# Patient Record
Sex: FEMALE | Race: WHITE | Employment: OTHER | URBAN - METROPOLITAN AREA
[De-identification: names, ages, dates, MRNs, and addresses within clinical notes are randomized per-mention and may not be internally consistent; named-entity substitution may affect disease eponyms.]

---

## 2017-03-13 PROBLEM — F32.1 MODERATE SINGLE CURRENT EPISODE OF MAJOR DEPRESSIVE DISORDER (HCC): Status: ACTIVE | Noted: 2017-03-13

## 2017-03-13 PROBLEM — Z71.89 ADVANCED CARE PLANNING/COUNSELING DISCUSSION: Chronic | Status: ACTIVE | Noted: 2017-03-13

## 2017-03-13 PROBLEM — Z71.89 ADVANCED CARE PLANNING/COUNSELING DISCUSSION: Status: ACTIVE | Noted: 2017-03-13

## 2017-03-13 PROBLEM — F51.01 PRIMARY INSOMNIA: Status: ACTIVE | Noted: 2017-03-13

## 2017-04-06 PROBLEM — E03.8 SUBCLINICAL HYPOTHYROIDISM: Status: ACTIVE | Noted: 2017-04-06

## 2017-04-11 PROBLEM — I10 ESSENTIAL HYPERTENSION: Status: ACTIVE | Noted: 2017-04-11

## 2017-04-13 ENCOUNTER — HOSPITAL ENCOUNTER (OUTPATIENT)
Dept: LAB | Age: 77
Discharge: HOME | End: 2017-04-13
Payer: MEDICARE

## 2017-04-13 DIAGNOSIS — R63.4 WEIGHT LOSS: ICD-10-CM

## 2017-04-13 DIAGNOSIS — R93.89 ABNORMAL CHEST X-RAY: ICD-10-CM

## 2017-04-13 PROCEDURE — 87116 MYCOBACTERIA CULTURE: CPT | Performed by: INTERNAL MEDICINE

## 2017-04-15 ENCOUNTER — HOSPITAL ENCOUNTER (OUTPATIENT)
Dept: CT IMAGING | Age: 77
Discharge: HOME | End: 2017-04-15
Attending: FAMILY MEDICINE
Payer: MEDICARE

## 2017-04-15 ENCOUNTER — HOSPITAL ENCOUNTER (OUTPATIENT)
Dept: CT IMAGING | Age: 77
Discharge: HOME | End: 2017-04-15
Attending: INTERNAL MEDICINE
Payer: MEDICARE

## 2017-04-15 DIAGNOSIS — R63.4 WEIGHT LOSS: ICD-10-CM

## 2017-04-15 DIAGNOSIS — R68.81 EARLY SATIETY: ICD-10-CM

## 2017-04-15 DIAGNOSIS — R93.89 ABNORMAL CHEST X-RAY: ICD-10-CM

## 2017-04-15 PROCEDURE — 74011636320 HC RX REV CODE- 636/320: Performed by: FAMILY MEDICINE

## 2017-04-15 PROCEDURE — 74011000258 HC RX REV CODE- 258: Performed by: FAMILY MEDICINE

## 2017-04-15 PROCEDURE — 74177 CT ABD & PELVIS W/CONTRAST: CPT

## 2017-04-15 PROCEDURE — 71250 CT THORAX DX C-: CPT

## 2017-04-15 RX ORDER — SODIUM CHLORIDE 0.9 % (FLUSH) 0.9 %
10 SYRINGE (ML) INJECTION
Status: COMPLETED | OUTPATIENT
Start: 2017-04-15 | End: 2017-04-15

## 2017-04-15 RX ADMIN — SODIUM CHLORIDE 100 ML: 900 INJECTION, SOLUTION INTRAVENOUS at 09:10

## 2017-04-15 RX ADMIN — DIATRIZOATE MEGLUMINE AND DIATRIZOATE SODIUM 15 ML: 660; 100 LIQUID ORAL; RECTAL at 09:10

## 2017-04-15 RX ADMIN — IOVERSOL 100 ML: 741 INJECTION INTRA-ARTERIAL; INTRAVENOUS at 09:10

## 2017-04-15 RX ADMIN — Medication 10 ML: at 09:10

## 2017-05-06 ENCOUNTER — HOSPITAL ENCOUNTER (OUTPATIENT)
Dept: MRI IMAGING | Age: 77
Discharge: HOME | End: 2017-05-06
Attending: FAMILY MEDICINE
Payer: MEDICARE

## 2017-05-06 DIAGNOSIS — R41.0 CONFUSION: ICD-10-CM

## 2017-05-06 PROCEDURE — 70551 MRI BRAIN STEM W/O DYE: CPT

## 2017-05-30 LAB
ACID FAST STN SPEC: NEGATIVE
MYCOBACTERIUM SPEC QL CULT: NEGATIVE
SPECIMEN PREPARATION: NORMAL
SPECIMEN SOURCE: NORMAL

## 2017-06-28 ENCOUNTER — HOSPITAL ENCOUNTER (OUTPATIENT)
Age: 77
Setting detail: OUTPATIENT SURGERY
Discharge: HOME | End: 2017-06-28
Attending: INTERNAL MEDICINE | Admitting: INTERNAL MEDICINE
Payer: MEDICARE

## 2017-06-28 VITALS
SYSTOLIC BLOOD PRESSURE: 145 MMHG | DIASTOLIC BLOOD PRESSURE: 69 MMHG | WEIGHT: 92 LBS | TEMPERATURE: 98.9 F | RESPIRATION RATE: 18 BRPM | HEIGHT: 64 IN | OXYGEN SATURATION: 95 % | BODY MASS INDEX: 15.71 KG/M2 | HEART RATE: 89 BPM

## 2017-06-28 DIAGNOSIS — R91.8 PULMONARY INFILTRATE: ICD-10-CM

## 2017-06-28 LAB
APPEARANCE FLD: NORMAL
COLOR FLD: NORMAL
FLUID COMMENTS, FCOM: NORMAL
LYMPHOCYTES NFR FLD: 4 %
NEUTS SEG NFR FLD: 96 %
NUC CELL # FLD: NORMAL /CU MM
RBC # FLD: NORMAL /CU MM
SPECIMEN SOURCE FLD: NORMAL

## 2017-06-28 PROCEDURE — 87799 DETECT AGENT NOS DNA QUANT: CPT | Performed by: INTERNAL MEDICINE

## 2017-06-28 PROCEDURE — 88112 CYTOPATH CELL ENHANCE TECH: CPT | Performed by: INTERNAL MEDICINE

## 2017-06-28 PROCEDURE — 87106 FUNGI IDENTIFICATION YEAST: CPT | Performed by: INTERNAL MEDICINE

## 2017-06-28 PROCEDURE — 89050 BODY FLUID CELL COUNT: CPT | Performed by: INTERNAL MEDICINE

## 2017-06-28 PROCEDURE — 87185 SC STD ENZYME DETCJ PER NZM: CPT | Performed by: INTERNAL MEDICINE

## 2017-06-28 PROCEDURE — 99152 MOD SED SAME PHYS/QHP 5/>YRS: CPT | Performed by: INTERNAL MEDICINE

## 2017-06-28 PROCEDURE — 76040000025: Performed by: INTERNAL MEDICINE

## 2017-06-28 PROCEDURE — 88305 TISSUE EXAM BY PATHOLOGIST: CPT | Performed by: INTERNAL MEDICINE

## 2017-06-28 PROCEDURE — 87116 MYCOBACTERIA CULTURE: CPT | Performed by: INTERNAL MEDICINE

## 2017-06-28 PROCEDURE — 74011250636 HC RX REV CODE- 250/636

## 2017-06-28 PROCEDURE — 77030012699 HC VLV SUC CNTRL OCOA -A: Performed by: INTERNAL MEDICINE

## 2017-06-28 PROCEDURE — 74011250636 HC RX REV CODE- 250/636: Performed by: INTERNAL MEDICINE

## 2017-06-28 PROCEDURE — 31624 DX BRONCHOSCOPE/LAVAGE: CPT | Performed by: INTERNAL MEDICINE

## 2017-06-28 PROCEDURE — 87102 FUNGUS ISOLATION CULTURE: CPT | Performed by: INTERNAL MEDICINE

## 2017-06-28 PROCEDURE — 87070 CULTURE OTHR SPECIMN AEROBIC: CPT | Performed by: INTERNAL MEDICINE

## 2017-06-28 PROCEDURE — 74011000250 HC RX REV CODE- 250: Performed by: INTERNAL MEDICINE

## 2017-06-28 RX ORDER — FENTANYL CITRATE 50 UG/ML
50 INJECTION, SOLUTION INTRAMUSCULAR; INTRAVENOUS
Status: DISCONTINUED | OUTPATIENT
Start: 2017-06-28 | End: 2017-06-28 | Stop reason: HOSPADM

## 2017-06-28 RX ORDER — LIDOCAINE HYDROCHLORIDE 20 MG/ML
JELLY TOPICAL AS NEEDED
Status: DISCONTINUED | OUTPATIENT
Start: 2017-06-28 | End: 2017-06-28 | Stop reason: HOSPADM

## 2017-06-28 RX ORDER — BENZONATATE 100 MG/1
100 CAPSULE ORAL
COMMUNITY
End: 2017-06-28 | Stop reason: SDUPTHER

## 2017-06-28 RX ORDER — SODIUM CHLORIDE 9 MG/ML
25 INJECTION, SOLUTION INTRAVENOUS CONTINUOUS
Status: DISCONTINUED | OUTPATIENT
Start: 2017-06-28 | End: 2017-06-28 | Stop reason: HOSPADM

## 2017-06-28 RX ORDER — SODIUM CHLORIDE 0.9 % (FLUSH) 0.9 %
5-10 SYRINGE (ML) INJECTION AS NEEDED
Status: DISCONTINUED | OUTPATIENT
Start: 2017-06-28 | End: 2017-06-28 | Stop reason: HOSPADM

## 2017-06-28 RX ORDER — LIDOCAINE HYDROCHLORIDE 40 MG/ML
SOLUTION TOPICAL AS NEEDED
Status: DISCONTINUED | OUTPATIENT
Start: 2017-06-28 | End: 2017-06-28 | Stop reason: HOSPADM

## 2017-06-28 RX ORDER — MIDAZOLAM HYDROCHLORIDE 1 MG/ML
.25-5 INJECTION, SOLUTION INTRAMUSCULAR; INTRAVENOUS
Status: DISCONTINUED | OUTPATIENT
Start: 2017-06-28 | End: 2017-06-28 | Stop reason: HOSPADM

## 2017-06-28 RX ORDER — SODIUM CHLORIDE 0.9 % (FLUSH) 0.9 %
5-10 SYRINGE (ML) INJECTION EVERY 8 HOURS
Status: DISCONTINUED | OUTPATIENT
Start: 2017-06-28 | End: 2017-06-28 | Stop reason: HOSPADM

## 2017-06-28 RX ADMIN — FENTANYL CITRATE 25 MCG: 50 INJECTION, SOLUTION INTRAMUSCULAR; INTRAVENOUS at 10:51

## 2017-06-28 RX ADMIN — FENTANYL CITRATE 25 MCG: 50 INJECTION, SOLUTION INTRAMUSCULAR; INTRAVENOUS at 10:53

## 2017-06-28 RX ADMIN — MIDAZOLAM HYDROCHLORIDE 0.5 MG: 1 INJECTION, SOLUTION INTRAMUSCULAR; INTRAVENOUS at 10:51

## 2017-06-28 RX ADMIN — LIDOCAINE HYDROCHLORIDE 1 ML: 20 JELLY TOPICAL at 10:43

## 2017-06-28 RX ADMIN — MIDAZOLAM HYDROCHLORIDE 1 MG: 1 INJECTION, SOLUTION INTRAMUSCULAR; INTRAVENOUS at 10:44

## 2017-06-28 RX ADMIN — FENTANYL CITRATE 25 MCG: 50 INJECTION, SOLUTION INTRAMUSCULAR; INTRAVENOUS at 10:46

## 2017-06-28 RX ADMIN — MIDAZOLAM HYDROCHLORIDE 1 MG: 1 INJECTION, SOLUTION INTRAMUSCULAR; INTRAVENOUS at 10:42

## 2017-06-28 RX ADMIN — FENTANYL CITRATE 25 MCG: 50 INJECTION, SOLUTION INTRAMUSCULAR; INTRAVENOUS at 10:42

## 2017-06-28 RX ADMIN — SODIUM CHLORIDE 25 ML/HR: 900 INJECTION, SOLUTION INTRAVENOUS at 10:43

## 2017-06-28 RX ADMIN — LIDOCAINE HYDROCHLORIDE 5 ML: 40 SOLUTION TOPICAL at 10:43

## 2017-06-28 RX ADMIN — FENTANYL CITRATE 25 MCG: 50 INJECTION, SOLUTION INTRAMUSCULAR; INTRAVENOUS at 10:44

## 2017-06-28 NOTE — PROGRESS NOTES
Date of Surgery Update: Lainey Flowers was seen and examined.   essentially no change since seen in office about a week ago  Carlos Mauro MD      Signed By: Carlos Mauro MD     June 28, 2017 10:58 AM

## 2017-06-28 NOTE — IP AVS SNAPSHOT
Nikky Hutchins 
 
 
 [address] 
[phone] Patient: Britney Escalera MRN: [id number] You are allergic to the following Allergen Reactions Azithromycin Rash Levofloxacin Anxiety Recent Documentation Height Weight Breastfeeding? BMI OB Status Smoking Status 1.626 m 41.7 kg No 15.79 kg/m2 Hysterectomy Never Smoker Emergency Contacts Name Discharge Info Relation Home Work Mobile [place] CAREGIVER [3] Spouse [3] [phone] About your hospitalization You were admitted on:  June 28, 2017 You last received care in the:  [facility] You were discharged on:  June 28, 2017 Unit phone number:  [phone] Why you were hospitalized Your primary diagnosis was:  Not on File Your diagnoses also included:  Pulmonary Infiltrate Providers Seen During Your Hospitalizations Provider Role Specialty Primary office phone Yolie Shetty MD Attending Provider Pulmonary Disease [phone] Your Primary Care Physician (PCP) Primary Care Physician Office Phone Office Fax Dawson Huitron [phone] Follow-up Information None Current Discharge Medication List  
  
ASK your doctor about these medications Dose & Instructions Dispensing Information Comments Morning Noon Evening Bedtime BENADRYL 25 mg capsule Generic drug:  diphenhydrAMINE Your last dose was: Your next dose is:    
   
   
 Dose:  25 mg Take 25 mg by mouth every six (6) hours as needed. Refills:  0  
     
   
   
   
  
 cholecalciferol 1,000 unit tablet Commonly known as:  VITAMIN D3 Your last dose was: Your next dose is:    
   
   
 Dose:  1000 Units Take 1,000 Units by mouth. Refills:  0 LORazepam 0.5 mg tablet Commonly known as:  ATIVAN  
   
 Your last dose was: Your next dose is: One every 8-12 hours as needed only for anxiety, worry Quantity:  30 Tab Refills:  1  
     
   
   
   
  
 metoprolol succinate 50 mg XL tablet Commonly known as:  TOPROL-XL Your last dose was: Your next dose is:    
   
   
 Dose:  50 mg Take 1 Tab by mouth daily. Quantity:  90 Tab Refills:  1 Discharge Instructions RESPIRATORY CARE - BRONCHOSCOPY - DISCHARGE INSTRUCTIONS You received a lot of numbing medication for your throat and nose, and you also received medication to make you sleepy during your procedure. Because of this and because the bronchoscopy may have irritated your airways, we ask that you follow these directions: 1. Do not eat or drink until  1 pm .   After that, you may have what you please. You may want to try some liquids first, because your throat may be a little sore. 2. Medication may cause drowsiness for several hours, therefore, do not drive or                  operate machinery for remainder of the day. No alcohol today. 3. You may cough up more mucus than usual and you may see some blood, but                   this is expected and should subside by the following day. 4. If severe throat irritation, coughing, or bleeding continue, call your doctor. 5.         If you run a fever greater than 102, call [facility] at [phone]. 6.         Dr. Naila Mcintyre has asked that you: A. Call the doctor's office at [phone] for problems. Karina Hoyos See Dr Alexander do the office on 9/22/17 as previously scheduled. Discharge Medications: 
 
  
 
Any additional instructions:  Motrin or tylenol for fever. Call Dr Naila Mcintyre next week for results of bronchoscopy at [phone] Instructions given to [facility] and other family members Discharge Orders None General Information Please provide this summary of care documentation to your next provider. Patient Signature:  ____________________________________________________________ Date:  ____________________________________________________________  
  
Alas Kendall Provider Signature:  ____________________________________________________________ Date:  ____________________________________________________________

## 2017-06-28 NOTE — IP AVS SNAPSHOT
Current Discharge Medication List  
  
ASK your doctor about these medications Dose & Instructions Dispensing Information Comments Morning Noon Evening Bedtime BENADRYL 25 mg capsule Generic drug:  diphenhydrAMINE Your last dose was: Your next dose is:    
   
   
 Dose:  25 mg Take 25 mg by mouth every six (6) hours as needed. Refills:  0  
     
   
   
   
  
 cholecalciferol 1,000 unit tablet Commonly known as:  VITAMIN D3 Your last dose was: Your next dose is:    
   
   
 Dose:  1000 Units Take 1,000 Units by mouth. Refills:  0 LORazepam 0.5 mg tablet Commonly known as:  ATIVAN Your last dose was: Your next dose is: One every 8-12 hours as needed only for anxiety, worry Quantity:  30 Tab Refills:  1  
     
   
   
   
  
 metoprolol succinate 50 mg XL tablet Commonly known as:  TOPROL-XL Your last dose was: Your next dose is:    
   
   
 Dose:  50 mg Take 1 Tab by mouth daily. Quantity:  90 Tab Refills:  1

## 2017-06-28 NOTE — DISCHARGE INSTRUCTIONS
RESPIRATORY CARE - BRONCHOSCOPY - DISCHARGE INSTRUCTIONS      You received a lot of numbing medication for your throat and nose, and you also received medication to make you sleepy during your procedure. Because of this and because the bronchoscopy may have irritated your airways, we ask that you follow these directions:    1. Do not eat or drink until  1 pm .   After that, you may have what you please. You may want to try some liquids first, because your throat may be a little sore. 2. Medication may cause drowsiness for several hours, therefore, do not drive or                  operate machinery for remainder of the day. No alcohol today. 3. You may cough up more mucus than usual and you may see some blood, but                   this is expected and should subside by the following day. 4. If severe throat irritation, coughing, or bleeding continue, call your doctor. 5.         If you run a fever greater than 102, call [facility] at [phone]. 6.         Dr. Myles Bernheim has asked that you:                A. Call the doctor's office at [phone] for problems. Derick Feng See Dr Gretchen Gilford n the office on 9/22/17 as previously scheduled. Discharge Medications:         Any additional instructions:  Motrin or tylenol for fever.   Call Dr Myles Bernheim next week for results of bronchoscopy at [phone]  Instructions given to [facility] and other family members

## 2017-06-28 NOTE — H&P
Date of Surgery Update: Dany Robledo was seen and examined. No changes since just seen in the office recently. To get BAL of TERESA to r/o PHOEBE.      Signed By: Ok Alcaraz MD     June 28, 2017 10:57 AM

## 2017-06-28 NOTE — ROUTINE PROCESS
Pt. Discharged to car by Andrea Harris with family . Vital signs stable. Able to tolerate PO fluids. Passing gas.  Seen by MD.

## 2017-06-28 NOTE — PROCEDURES
Procedure: Bronchoscopy    Time Out Done -- Correct Patient Identified. Everyone Agrees. Anesthesia- 2.5 mg Versed, 125 mcg Fentanyl  Indication- as per above  Post Procedure diagnosis: as per above  Instrument-  Olympus bronchosope     Procedure   The flexible fiberoptic bronchoscope was introduced through the mouth with bite block . Advanced to vocal cords and noted with normal anatomy and function. increased mucus coming up from airways. Advanced down trachea to laz. Laz sharp without any pathology noted. Advanced to left main stem bronchus and down to TERESA and LLL. Noted normal anatomy and segments. Noted increased vascularity and mucus. BAL of TERESA lobe but very collapsible, with some suction trauma noted. Scope then passed to Right main stem bronchus. Noted normal RUL anatomy. Then advanced to bronchus intermedius and noted RML and RLL with normal segments. Patient did have mild to moderate secretions that needed to be cleared with normal saline with more on left side vs right side. Patient tolerated procedure well, no complications. EBL -- trace suction trauma. BAL sent for   Samples sent for:    Gram Stain  Cultures both routine and fungal.   AFB       BW sent for   Gram stain, cultures  AFB  Cytology. Patient and  aware will take a week for some results and final AFB will be back in about 6 weeks.      Esequiel Xavier MD

## 2017-07-01 LAB
A FUMIGATUS DNA SPEC QL NAA+PROBE: NOT DETECTED
A TERREUS DNA SPEC QL NAA+PROBE: NOT DETECTED
ASPERGILLUS DNA SPEC QL NAA+PROBE: NOT DETECTED
BACTERIA SPEC CULT: ABNORMAL
GRAM STN SPEC: ABNORMAL
SERVICE CMNT-IMP: ABNORMAL
SERVICE CMNT-IMP: ABNORMAL

## 2017-07-05 LAB
FUNGUS CULTURE, RFCO2T: POSITIVE
FUNGUS SMEAR, RFCO1T: ABNORMAL
FUNGUS SPEC CULT: NORMAL
FUNGUS STAIN, 188244: NORMAL
REFLEX TO ID, RFCO3T: ABNORMAL
SPECIMEN SOURCE: ABNORMAL
SPECIMEN SOURCE: NORMAL

## 2017-07-06 LAB
FUNGUS ID 1, (DID), RFIM1T: NORMAL
SPECIMEN SOURCE: NORMAL

## 2017-08-07 LAB
FUNGUS CULTURE, RFCO2T: POSITIVE
FUNGUS ID 1, (DID), RFIM1T: NORMAL
FUNGUS SMEAR, RFCO1T: ABNORMAL
FUNGUS SPEC CULT: NORMAL
FUNGUS STAIN, 188244: NORMAL
REFLEX TO ID, RFCO3T: ABNORMAL
SPECIMEN SOURCE: ABNORMAL
SPECIMEN SOURCE: NORMAL
SPECIMEN SOURCE: NORMAL

## 2017-08-11 LAB
ACID FAST STN SPEC: NEGATIVE
ACID FAST STN SPEC: NEGATIVE
MYCOBACTERIUM SPEC QL CULT: NEGATIVE
MYCOBACTERIUM SPEC QL CULT: NEGATIVE
SPECIMEN PREPARATION: NORMAL
SPECIMEN PREPARATION: NORMAL
SPECIMEN SOURCE: NORMAL
SPECIMEN SOURCE: NORMAL

## 2017-09-22 PROBLEM — J47.1 BRONCHIECTASIS WITH ACUTE EXACERBATION (HCC): Status: ACTIVE | Noted: 2017-09-22

## 2017-10-26 PROBLEM — E03.9 ACQUIRED HYPOTHYROIDISM: Status: ACTIVE | Noted: 2017-04-06

## 2018-06-07 ENCOUNTER — HOSPITAL ENCOUNTER (OUTPATIENT)
Dept: MAMMOGRAPHY | Age: 78
Discharge: HOME | End: 2018-06-07
Attending: FAMILY MEDICINE
Payer: MEDICARE

## 2018-06-07 DIAGNOSIS — Z12.31 ENCOUNTER FOR SCREENING MAMMOGRAM FOR MALIGNANT NEOPLASM OF BREAST: ICD-10-CM

## 2018-06-07 DIAGNOSIS — M89.9 DISORDER OF BONE AND CARTILAGE: ICD-10-CM

## 2018-06-07 DIAGNOSIS — M94.9 DISORDER OF BONE AND CARTILAGE: ICD-10-CM

## 2018-06-07 PROCEDURE — 77067 SCR MAMMO BI INCL CAD: CPT

## 2018-06-07 PROCEDURE — 77080 DXA BONE DENSITY AXIAL: CPT

## 2018-06-12 NOTE — PROGRESS NOTES
Pt returned call, advised of dexa results. At this time she does not want to start fosamax again. If she changes her mind, she will let us know.

## 2022-02-24 PROBLEM — R91.8 PULMONARY INFILTRATE: Status: RESOLVED | Noted: 2017-06-28 | Resolved: 2022-02-24

## 2022-03-18 PROBLEM — I10 ESSENTIAL HYPERTENSION: Status: ACTIVE | Noted: 2017-04-11

## 2022-03-18 PROBLEM — J47.1 BRONCHIECTASIS WITH ACUTE EXACERBATION (HCC): Status: ACTIVE | Noted: 2017-09-22

## 2022-03-18 PROBLEM — E03.9 ACQUIRED HYPOTHYROIDISM: Status: ACTIVE | Noted: 2017-04-06

## 2022-03-19 PROBLEM — F51.01 PRIMARY INSOMNIA: Status: ACTIVE | Noted: 2017-03-13

## 2022-03-20 PROBLEM — Z71.89 ADVANCED CARE PLANNING/COUNSELING DISCUSSION: Status: ACTIVE | Noted: 2017-03-13

## 2022-03-20 PROBLEM — F32.1 MODERATE SINGLE CURRENT EPISODE OF MAJOR DEPRESSIVE DISORDER (HCC): Status: ACTIVE | Noted: 2017-03-13

## 2022-05-27 ENCOUNTER — OFFICE VISIT (OUTPATIENT)
Dept: FAMILY MEDICINE CLINIC | Facility: CLINIC | Age: 82
End: 2022-05-27
Payer: MEDICARE

## 2022-05-27 VITALS
BODY MASS INDEX: 18 KG/M2 | WEIGHT: 97.8 LBS | SYSTOLIC BLOOD PRESSURE: 124 MMHG | DIASTOLIC BLOOD PRESSURE: 62 MMHG | HEIGHT: 62 IN

## 2022-05-27 DIAGNOSIS — F43.21 GRIEF REACTION: Primary | ICD-10-CM

## 2022-05-27 DIAGNOSIS — F41.9 ANXIETY: ICD-10-CM

## 2022-05-27 PROCEDURE — 4004F PT TOBACCO SCREEN RCVD TLK: CPT | Performed by: FAMILY MEDICINE

## 2022-05-27 PROCEDURE — G8428 CUR MEDS NOT DOCUMENT: HCPCS | Performed by: FAMILY MEDICINE

## 2022-05-27 PROCEDURE — 99213 OFFICE O/P EST LOW 20 MIN: CPT | Performed by: FAMILY MEDICINE

## 2022-05-27 PROCEDURE — 1090F PRES/ABSN URINE INCON ASSESS: CPT | Performed by: FAMILY MEDICINE

## 2022-05-27 PROCEDURE — G8399 PT W/DXA RESULTS DOCUMENT: HCPCS | Performed by: FAMILY MEDICINE

## 2022-05-27 PROCEDURE — 1123F ACP DISCUSS/DSCN MKR DOCD: CPT | Performed by: FAMILY MEDICINE

## 2022-05-27 PROCEDURE — G8419 CALC BMI OUT NRM PARAM NOF/U: HCPCS | Performed by: FAMILY MEDICINE

## 2022-05-27 RX ORDER — HYDROXYZINE HYDROCHLORIDE 25 MG/1
TABLET, FILM COATED ORAL
Qty: 30 TABLET | Refills: 0 | Status: SHIPPED | OUTPATIENT
Start: 2022-05-27 | End: 2022-06-21 | Stop reason: ALTCHOICE

## 2022-05-27 ASSESSMENT — PATIENT HEALTH QUESTIONNAIRE - PHQ9
SUM OF ALL RESPONSES TO PHQ QUESTIONS 1-9: 6
SUM OF ALL RESPONSES TO PHQ QUESTIONS 1-9: 6
SUM OF ALL RESPONSES TO PHQ9 QUESTIONS 1 & 2: 3
8. MOVING OR SPEAKING SO SLOWLY THAT OTHER PEOPLE COULD HAVE NOTICED. OR THE OPPOSITE, BEING SO FIGETY OR RESTLESS THAT YOU HAVE BEEN MOVING AROUND A LOT MORE THAN USUAL: 0
3. TROUBLE FALLING OR STAYING ASLEEP: 1
1. LITTLE INTEREST OR PLEASURE IN DOING THINGS: 2
2. FEELING DOWN, DEPRESSED OR HOPELESS: 1
SUM OF ALL RESPONSES TO PHQ QUESTIONS 1-9: 6
SUM OF ALL RESPONSES TO PHQ QUESTIONS 1-9: 6
5. POOR APPETITE OR OVEREATING: 1
7. TROUBLE CONCENTRATING ON THINGS, SUCH AS READING THE NEWSPAPER OR WATCHING TELEVISION: 0
9. THOUGHTS THAT YOU WOULD BE BETTER OFF DEAD, OR OF HURTING YOURSELF: 0
6. FEELING BAD ABOUT YOURSELF - OR THAT YOU ARE A FAILURE OR HAVE LET YOURSELF OR YOUR FAMILY DOWN: 0
4. FEELING TIRED OR HAVING LITTLE ENERGY: 1
10. IF YOU CHECKED OFF ANY PROBLEMS, HOW DIFFICULT HAVE THESE PROBLEMS MADE IT FOR YOU TO DO YOUR WORK, TAKE CARE OF THINGS AT HOME, OR GET ALONG WITH OTHER PEOPLE: 1

## 2022-05-27 NOTE — PROGRESS NOTES
Subjective:   Eduar Toro is a 78 y.o. female presents today for grief. 's had a rough winter and spring, was in rehab in the hospital for over 30 days. Recently brought home and is in hospice. Can be irritable and combative. She does have a hospice help now in the home but she has not been sleeping as well as she normally does. Appetite down. Is lost little weight. Grieving. Anxious, restless, would like to try something. PHQ-9 Total Score: 6 (5/27/2022  9:16 AM)  Thoughts that you would be better off dead, or of hurting yourself in some way: 0 (5/27/2022  9:16 AM)    Allergies   Allergen Reactions    Azithromycin Rash    Levofloxacin Anxiety     PMH, MEDS reviewed     Objective:   Blood pressure 124/62, height 5' 2.25\" (1.581 m), weight 97 lb 12.8 oz (44.4 kg). General- Pleasant and no distress  Psych- alert and oriented to person, place and time  Mood and affect are appropriate to situation  Musculoskeletal - Gait and station examination reveals mid-position with no abnormalities. Neurological- grossly intact. rrr s mrg.   bcta    Assessment/Plan:     1. Grief reaction    2. Anxiety         1. Grief reaction  -     hydrOXYzine (ATARAX) 25 MG tablet; one every 8-12 hours for anxiety. Caution drowsiness, Disp-30 tablet, R-0Normal  2. Anxiety  -     hydrOXYzine (ATARAX) 25 MG tablet; one every 8-12 hours for anxiety. Caution drowsiness, Disp-30 tablet, R-0Normal    one every 8-12 hours for anxiety. Caution drowsiness emphasized. Very concerned about the potential for drowsiness but she realizes that our options are great. Lets try this. But very careful with any symptoms of dizziness, drowsiness, propensity to fall. If any of these happen or she is frightened by them we may have to try something different. Followup:   No follow-up provider specified.   prn

## 2022-06-01 RX ORDER — SERTRALINE HYDROCHLORIDE 100 MG/1
100 TABLET, FILM COATED ORAL DAILY
Qty: 30 TABLET | Refills: 5 | Status: SHIPPED | OUTPATIENT
Start: 2022-06-01 | End: 2022-06-21 | Stop reason: SINTOL

## 2022-06-01 NOTE — TELEPHONE ENCOUNTER
Thyroid checked less than a year ago was fine. Use the Atarax as needed for agitation. In the meantime we need to call in something for anxiety.   You start with Zoloft 100 mg a day #35 refills

## 2022-06-01 NOTE — TELEPHONE ENCOUNTER
----- Message from [place], [place] sent at 6/1/2022  1:58 PM EDT -----  Subject: Referral Request    QUESTIONS   Reason for referral request? bloodwork if needed   Has the physician seen you for this condition before? Yes  Select a date? 2022-05-27  Select the Provider the patient wants to be referred to, if known (PCP or   Specialist)? Outside Physician - the office   Preferred Specialist (if applicable)? Do you already have an appointment scheduled? No  Additional Information for Provider? Pt is still experiencing symptoms   associated with her depression. Pt is still feeling jittery & depressed   and would like to have her Thyroid checked. Pt call pt to discuss/advise.  ---------------------------------------------------------------------------  --------------  CALL BACK INFO  What is the best way for the office to contact you? OK to leave message on   voicemail  Preferred Call Back Phone Number? [phone]  ---------------------------------------------------------------------------  --------------  SCRIPT ANSWERS  Relationship to Patient?  Self

## 2022-06-21 ENCOUNTER — OFFICE VISIT (OUTPATIENT)
Dept: FAMILY MEDICINE CLINIC | Facility: CLINIC | Age: 82
End: 2022-06-21
Payer: MEDICARE

## 2022-06-21 VITALS
HEIGHT: 62 IN | DIASTOLIC BLOOD PRESSURE: 62 MMHG | BODY MASS INDEX: 17.66 KG/M2 | WEIGHT: 96 LBS | HEART RATE: 72 BPM | OXYGEN SATURATION: 96 % | SYSTOLIC BLOOD PRESSURE: 120 MMHG

## 2022-06-21 DIAGNOSIS — F32.1 MODERATE SINGLE CURRENT EPISODE OF MAJOR DEPRESSIVE DISORDER (HCC): Primary | ICD-10-CM

## 2022-06-21 DIAGNOSIS — E03.9 ACQUIRED HYPOTHYROIDISM: ICD-10-CM

## 2022-06-21 PROBLEM — Z71.89 ADVANCED CARE PLANNING/COUNSELING DISCUSSION: Chronic | Status: ACTIVE | Noted: 2017-03-13

## 2022-06-21 LAB — TSH, 3RD GENERATION: 3 UIU/ML (ref 0.36–3.74)

## 2022-06-21 PROCEDURE — 99213 OFFICE O/P EST LOW 20 MIN: CPT | Performed by: FAMILY MEDICINE

## 2022-06-21 PROCEDURE — 4004F PT TOBACCO SCREEN RCVD TLK: CPT | Performed by: FAMILY MEDICINE

## 2022-06-21 PROCEDURE — G8419 CALC BMI OUT NRM PARAM NOF/U: HCPCS | Performed by: FAMILY MEDICINE

## 2022-06-21 PROCEDURE — 1123F ACP DISCUSS/DSCN MKR DOCD: CPT | Performed by: FAMILY MEDICINE

## 2022-06-21 PROCEDURE — G8399 PT W/DXA RESULTS DOCUMENT: HCPCS | Performed by: FAMILY MEDICINE

## 2022-06-21 PROCEDURE — 1090F PRES/ABSN URINE INCON ASSESS: CPT | Performed by: FAMILY MEDICINE

## 2022-06-21 PROCEDURE — G8427 DOCREV CUR MEDS BY ELIG CLIN: HCPCS | Performed by: FAMILY MEDICINE

## 2022-06-21 ASSESSMENT — PATIENT HEALTH QUESTIONNAIRE - PHQ9
SUM OF ALL RESPONSES TO PHQ QUESTIONS 1-9: 10
4. FEELING TIRED OR HAVING LITTLE ENERGY: 3
6. FEELING BAD ABOUT YOURSELF - OR THAT YOU ARE A FAILURE OR HAVE LET YOURSELF OR YOUR FAMILY DOWN: 0
SUM OF ALL RESPONSES TO PHQ QUESTIONS 1-9: 10
10. IF YOU CHECKED OFF ANY PROBLEMS, HOW DIFFICULT HAVE THESE PROBLEMS MADE IT FOR YOU TO DO YOUR WORK, TAKE CARE OF THINGS AT HOME, OR GET ALONG WITH OTHER PEOPLE: 0
SUM OF ALL RESPONSES TO PHQ9 QUESTIONS 1 & 2: 2
SUM OF ALL RESPONSES TO PHQ QUESTIONS 1-9: 10
3. TROUBLE FALLING OR STAYING ASLEEP: 1
2. FEELING DOWN, DEPRESSED OR HOPELESS: 2
SUM OF ALL RESPONSES TO PHQ QUESTIONS 1-9: 10
5. POOR APPETITE OR OVEREATING: 3
8. MOVING OR SPEAKING SO SLOWLY THAT OTHER PEOPLE COULD HAVE NOTICED. OR THE OPPOSITE, BEING SO FIGETY OR RESTLESS THAT YOU HAVE BEEN MOVING AROUND A LOT MORE THAN USUAL: 1
9. THOUGHTS THAT YOU WOULD BE BETTER OFF DEAD, OR OF HURTING YOURSELF: 0
1. LITTLE INTEREST OR PLEASURE IN DOING THINGS: 0
7. TROUBLE CONCENTRATING ON THINGS, SUCH AS READING THE NEWSPAPER OR WATCHING TELEVISION: 0

## 2022-06-21 NOTE — PROGRESS NOTES
Subjective:   Jermaine Cameron is a 78 y.o. female presents today for depression and mood followup. Doing okay. Basically is just hard with her  and hospice. Sometimes she is not happy about the care he is receiving but overall she tries to go over there and feed him maybe once a day. She is trying to take care of her self but her appetite is not that great. She would like her thyroid checked just to make sure    PHQ-9 Total Score: 10 (6/21/2022  2:35 PM)  Thoughts that you would be better off dead, or of hurting yourself in some way: 0 (6/21/2022  2:35 PM)      No flowsheet data found. Allergies   Allergen Reactions    Azithromycin Rash    Levofloxacin Anxiety     PMH, MEDS reviewed     Objective:   Blood pressure 120/62, pulse 72, height 5' 2.25\" (1.581 m), weight 96 lb (43.5 kg), SpO2 96 %. General- Pleasant and no distress  Psych- alert and oriented to person, place and time  Mood and affect are appropriate to situation  Musculoskeletal - Gait and station examination reveals mid-position with no abnormalities. Neurological- grossly intact. Much of today's visit spent counseling with review of symptoms, disposition, prognosis, and risk/benefit of treatment plan options in addition to limited behavioral counseling and recommendations, and the counseling has included my impressions of any previous diagnostic results, but also the importance of compliance and follow up, and we have talked about risk factor reduction where appropriate  No changes today. Is a tough situation. Encouraged her to get time for herself, use boost nutrition, get some rest, rely on the nurses at the facility, I will check her TSH, but no easy answer    Assessment/Plan:     1. Moderate single current episode of major depressive disorder ([facility] [address]    2. Acquired hypothyroidism        1. Moderate single current episode of major depressive disorder ([facility] [address]  2. Acquired hypothyroidism  -     TSH;  Future    To call if notions of suicide develop. Increase exercise, avoid alcohol and other depressing drugs.

## 2022-07-07 ENCOUNTER — OFFICE VISIT (OUTPATIENT)
Dept: FAMILY MEDICINE CLINIC | Facility: CLINIC | Age: 82
End: 2022-07-07
Payer: MEDICARE

## 2022-07-07 VITALS
SYSTOLIC BLOOD PRESSURE: 128 MMHG | HEART RATE: 69 BPM | WEIGHT: 94.6 LBS | HEIGHT: 62 IN | OXYGEN SATURATION: 97 % | DIASTOLIC BLOOD PRESSURE: 64 MMHG | BODY MASS INDEX: 17.41 KG/M2

## 2022-07-07 DIAGNOSIS — F32.1 MODERATE SINGLE CURRENT EPISODE OF MAJOR DEPRESSIVE DISORDER (HCC): Primary | ICD-10-CM

## 2022-07-07 PROCEDURE — 1123F ACP DISCUSS/DSCN MKR DOCD: CPT | Performed by: FAMILY MEDICINE

## 2022-07-07 PROCEDURE — 4004F PT TOBACCO SCREEN RCVD TLK: CPT | Performed by: FAMILY MEDICINE

## 2022-07-07 PROCEDURE — 99213 OFFICE O/P EST LOW 20 MIN: CPT | Performed by: FAMILY MEDICINE

## 2022-07-07 PROCEDURE — G8419 CALC BMI OUT NRM PARAM NOF/U: HCPCS | Performed by: FAMILY MEDICINE

## 2022-07-07 PROCEDURE — 1090F PRES/ABSN URINE INCON ASSESS: CPT | Performed by: FAMILY MEDICINE

## 2022-07-07 PROCEDURE — G8428 CUR MEDS NOT DOCUMENT: HCPCS | Performed by: FAMILY MEDICINE

## 2022-07-07 PROCEDURE — G8399 PT W/DXA RESULTS DOCUMENT: HCPCS | Performed by: FAMILY MEDICINE

## 2022-07-07 RX ORDER — BUPROPION HYDROCHLORIDE 150 MG/1
150 TABLET ORAL EVERY MORNING
Qty: 30 TABLET | Refills: 5 | Status: SHIPPED | OUTPATIENT
Start: 2022-07-07 | End: 2022-07-18 | Stop reason: SINTOL

## 2022-07-07 ASSESSMENT — PATIENT HEALTH QUESTIONNAIRE - PHQ9
3. TROUBLE FALLING OR STAYING ASLEEP: 3
10. IF YOU CHECKED OFF ANY PROBLEMS, HOW DIFFICULT HAVE THESE PROBLEMS MADE IT FOR YOU TO DO YOUR WORK, TAKE CARE OF THINGS AT HOME, OR GET ALONG WITH OTHER PEOPLE: 0
9. THOUGHTS THAT YOU WOULD BE BETTER OFF DEAD, OR OF HURTING YOURSELF: 0
SUM OF ALL RESPONSES TO PHQ QUESTIONS 1-9: 10
SUM OF ALL RESPONSES TO PHQ QUESTIONS 1-9: 10
6. FEELING BAD ABOUT YOURSELF - OR THAT YOU ARE A FAILURE OR HAVE LET YOURSELF OR YOUR FAMILY DOWN: 3
SUM OF ALL RESPONSES TO PHQ QUESTIONS 1-9: 10
4. FEELING TIRED OR HAVING LITTLE ENERGY: 1
1. LITTLE INTEREST OR PLEASURE IN DOING THINGS: 0
8. MOVING OR SPEAKING SO SLOWLY THAT OTHER PEOPLE COULD HAVE NOTICED. OR THE OPPOSITE, BEING SO FIGETY OR RESTLESS THAT YOU HAVE BEEN MOVING AROUND A LOT MORE THAN USUAL: 0
5. POOR APPETITE OR OVEREATING: 3
SUM OF ALL RESPONSES TO PHQ QUESTIONS 1-9: 10
7. TROUBLE CONCENTRATING ON THINGS, SUCH AS READING THE NEWSPAPER OR WATCHING TELEVISION: 0

## 2022-07-07 NOTE — PROGRESS NOTES
Subjective:   Tete Rebolledo is a 78 y.o. female presents today for depression and mood followup because she called today frustrated. Could not tolerate the Zoloft. Created much anxiety. Her  now is in supervised care. That still is a difficult situation and she is not happy about the way he is being treated. She is hearing organ music and recognizes that her anxiety and depression is making her more nervous, more restless, appetite is worse. And she recognizes all of this. PHQ9 Administered:  PHQ-9 Total Score: 10 (7/7/2022 10:32 AM)  Thoughts that you would be better off dead, or of hurting yourself in some way: 0 (7/7/2022 10:32 AM)      No flowsheet data found. Allergies   Allergen Reactions    Azithromycin Rash    Levofloxacin Anxiety     PM, MEDS reviewed     Objective:   Blood pressure 128/64, pulse 69, height 5' 2.25\" (1.581 m), weight 94 lb 9.6 oz (42.9 kg), SpO2 97 %. General- Pleasant and no distress  Psych- alert and oriented to person, place and time  Mood and affect are appropriate to situation  Musculoskeletal - Gait and station examination reveals mid-position with no abnormalities. Neurological- grossly intact. Much of today's visit spent counseling with review of symptoms, disposition, prognosis, and risk/benefit of treatment plan options in addition to limited behavioral counseling and recommendations, and the counseling has included my impressions of any previous diagnostic results, but also the importance of compliance and follow up, and we have talked about risk factor reduction where appropriate. I would like to try Wellbutrin. Other than that our assessment has not changed. She is in a difficult situation has a little bit of family but not a lot and what is available does not seem to participate as often as she would like in her 's care      Assessment/Plan:     1. Moderate single current episode of major depressive disorder ([facility] [address]        1.  Moderate single current episode of major depressive disorder (HCC)  -     buPROPion (WELLBUTRIN XL) 150 MG extended release tablet; Take 1 tablet by mouth every morning, Disp-30 tablet, R-5Normal    To call if notions of suicide develop. Increase exercise, avoid alcohol and other depressing drugs.

## 2022-07-18 ENCOUNTER — TELEPHONE (OUTPATIENT)
Dept: FAMILY MEDICINE CLINIC | Facility: CLINIC | Age: 82
End: 2022-07-18

## 2022-07-18 NOTE — TELEPHONE ENCOUNTER
Pt called back to let me know that she had called 911 the other night because of dizziness and her BP was 182/89 when they got to her.

## 2022-07-18 NOTE — TELEPHONE ENCOUNTER
PT calling in stating that she cannot tolerate buPROPion (WELLBUTRIN XL) 150 MG extended release tablet as it made her jittery and nervous, pt quit taking it 7/17 as it made her extremely dizzy as well.  Pt would like to know if maybe there is something she can just take every once in a while when she needs it

## 2022-07-19 RX ORDER — BUSPIRONE HYDROCHLORIDE 10 MG/1
10 TABLET ORAL 3 TIMES DAILY
Qty: 60 TABLET | Refills: 5 | Status: SHIPPED | OUTPATIENT
Start: 2022-07-19 | End: 2022-08-18

## 2022-08-17 ENCOUNTER — OFFICE VISIT (OUTPATIENT)
Dept: FAMILY MEDICINE CLINIC | Facility: CLINIC | Age: 82
End: 2022-08-17
Payer: MEDICARE

## 2022-08-17 VITALS
BODY MASS INDEX: 16.87 KG/M2 | WEIGHT: 93 LBS | DIASTOLIC BLOOD PRESSURE: 58 MMHG | SYSTOLIC BLOOD PRESSURE: 120 MMHG | HEART RATE: 67 BPM | OXYGEN SATURATION: 97 %

## 2022-08-17 DIAGNOSIS — E03.9 ACQUIRED HYPOTHYROIDISM: ICD-10-CM

## 2022-08-17 DIAGNOSIS — I10 ESSENTIAL HYPERTENSION: Primary | ICD-10-CM

## 2022-08-17 DIAGNOSIS — I10 ESSENTIAL HYPERTENSION: ICD-10-CM

## 2022-08-17 LAB
ANION GAP SERPL CALC-SCNC: 4 MMOL/L (ref 7–16)
BUN SERPL-MCNC: 24 MG/DL (ref 8–23)
CALCIUM SERPL-MCNC: 9.6 MG/DL (ref 8.3–10.4)
CHLORIDE SERPL-SCNC: 105 MMOL/L (ref 98–107)
CO2 SERPL-SCNC: 30 MMOL/L (ref 21–32)
CREAT SERPL-MCNC: 1.2 MG/DL (ref 0.6–1)
GLUCOSE SERPL-MCNC: 93 MG/DL (ref 65–100)
POTASSIUM SERPL-SCNC: 4.2 MMOL/L (ref 3.5–5.1)
SODIUM SERPL-SCNC: 139 MMOL/L (ref 136–145)
TSH, 3RD GENERATION: 3.92 UIU/ML (ref 0.36–3.74)

## 2022-08-17 PROCEDURE — G8399 PT W/DXA RESULTS DOCUMENT: HCPCS | Performed by: FAMILY MEDICINE

## 2022-08-17 PROCEDURE — G8419 CALC BMI OUT NRM PARAM NOF/U: HCPCS | Performed by: FAMILY MEDICINE

## 2022-08-17 PROCEDURE — G8427 DOCREV CUR MEDS BY ELIG CLIN: HCPCS | Performed by: FAMILY MEDICINE

## 2022-08-17 PROCEDURE — 1090F PRES/ABSN URINE INCON ASSESS: CPT | Performed by: FAMILY MEDICINE

## 2022-08-17 PROCEDURE — 1036F TOBACCO NON-USER: CPT | Performed by: FAMILY MEDICINE

## 2022-08-17 PROCEDURE — 99214 OFFICE O/P EST MOD 30 MIN: CPT | Performed by: FAMILY MEDICINE

## 2022-08-17 PROCEDURE — 1123F ACP DISCUSS/DSCN MKR DOCD: CPT | Performed by: FAMILY MEDICINE

## 2022-08-17 RX ORDER — LOSARTAN POTASSIUM 50 MG/1
50 TABLET ORAL DAILY
Qty: 90 TABLET | Refills: 1 | Status: SHIPPED | OUTPATIENT
Start: 2022-08-17

## 2022-08-17 RX ORDER — METOPROLOL SUCCINATE 50 MG/1
50 TABLET, EXTENDED RELEASE ORAL DAILY
Qty: 90 TABLET | Refills: 1 | Status: SHIPPED | OUTPATIENT
Start: 2022-08-17

## 2022-08-17 RX ORDER — LEVOTHYROXINE SODIUM 0.05 MG/1
50 TABLET ORAL
Qty: 90 TABLET | Refills: 1 | Status: SHIPPED | OUTPATIENT
Start: 2022-08-17

## 2022-08-17 ASSESSMENT — PATIENT HEALTH QUESTIONNAIRE - PHQ9
SUM OF ALL RESPONSES TO PHQ QUESTIONS 1-9: 2
SUM OF ALL RESPONSES TO PHQ QUESTIONS 1-9: 2
2. FEELING DOWN, DEPRESSED OR HOPELESS: 1
SUM OF ALL RESPONSES TO PHQ QUESTIONS 1-9: 2
SUM OF ALL RESPONSES TO PHQ9 QUESTIONS 1 & 2: 2
SUM OF ALL RESPONSES TO PHQ QUESTIONS 1-9: 2
1. LITTLE INTEREST OR PLEASURE IN DOING THINGS: 1

## 2022-08-17 NOTE — PROGRESS NOTES
Subjective:  Flor Montes is a 78 y.o. female presents today for their semi-annual htn visit. They are having no side effects and are doing well. Systems review of cardiovascular and pulmonary systems reveal no complaints or pertinent positives. Patient denies any pounding heart beats or rapid heart beat intervals, flushing, panic like attacks, headaches, dizzyness or flushing. They have drug reistant hypertension, on 3 or more different medicaitons including one diuretic- No  PHQ-9 Total Score: 2 (8/17/2022  8:38 AM)  Also due for annual hypothyroidism refill for Synthroid, and annual TSH  How much are you exercising? daily  Do you smoke? No  Are you taking your medicines as directed most every day? Yes  Do you follow a low sodium DASH diet or similar high blood pressure diet? No  Do you check your bp at home with an automated blood pressure device? Yes  If you don't have a home bp machine, you should purchase one at home and begin to check your pressure at home on a regular basis. Your blood pressure goal is listed under the \"plan section\" below    Allergies   Allergen Reactions    Azithromycin Rash    Levofloxacin Anxiety      reports that she has never smoked. She has never used smokeless tobacco.  Current Outpatient Medications   Medication Sig Dispense Refill    busPIRone (BUSPAR) 10 MG tablet Take 1 tablet by mouth in the morning and 1 tablet at noon and 1 tablet before bedtime. 60 tablet 5    Biotin 1 MG CAPS Take by mouth daily      vitamin D (CHOLECALCIFEROL) 25 MCG (1000 UT) TABS tablet Take 1,000 Units by mouth      levothyroxine (SYNTHROID) 50 MCG tablet Take 50 mcg by mouth every morning (before breakfast)      losartan (COZAAR) 50 MG tablet Take 50 mg by mouth      metoprolol succinate (TOPROL XL) 50 MG extended release tablet Take 50 mg by mouth       No current facility-administered medications for this visit.        Lab Results   Component Value Date/Time     02/24/2022 09:59 AM    K 4.4 02/24/2022 09:59 AM     02/24/2022 09:59 AM    CO2 23 02/24/2022 09:59 AM    BUN 19 02/24/2022 09:59 AM    CREATININE 1.30 02/24/2022 09:59 AM    GLUCOSE 74 02/24/2022 09:59 AM    CALCIUM 9.8 02/24/2022 09:59 AM          Objective:  Blood pressure (!) 120/58, pulse 67, weight 93 lb (42.2 kg), SpO2 97 %. Body mass index is 16.87 kg/m². BP Readings from Last 3 Encounters:   08/17/22 (!) 120/58   07/07/22 128/64   06/21/22 120/62     General- Pleasant and no distress  Psych- alert and oriented to person, place and time  Mood and affect are appropriate to situation  Musculoskeletal - Gait and station examination reveals mid-position with no abnormalities. Neurological- grossly intact. rrr s mrg.   bcta  extremeties are without edema, and dp, and pt pulses are intact  No carotid bruits   Fundoscopic exam is: Fundoscopic exam is consistent with arteriolar narrowing and AV nicking as well as some rare microaneurysms     Assessment:  1. Essential hypertension    2. Acquired hypothyroidism      Plan:   Prescription drug management occurs today as follows:  Because current regimen for blood pressure is now working and tolerated, will continue medications as listed    Janette Armenta has been given the following recommendations today due to her elevated BP reading: lab tests ordered. Annual TSH is pending. Continue on your current dose of thyroid medicine until we contact you with today's lab results. Also, remember, it is very important to take your thyroid medication on an empty stomach WITH WATER ONLY, not juice, not milk, and as the first medicine in the morning (unless you have been told to take it at bedtime on an empty stomach). You should take no other medication or food for 1 hr after taking your thyroid medicine. Therefore, most people take their thyroid medicine immediately after awakening in the morning.   You can take all other SUPPLEMENTS  (vitamins, minerals etc.) about 3 hours later, or better yet with lunch or dinner. We will call you back to reschedule a return lab appointment if the TSH we becky today is still not optimal. That appointment will be about 8-10 weeks from today in order to give the new dose we phone in to reach a steady state. If your thyroid stimulating hormone is normal today, we will recheck your level in one year, and you will just continue your current thyroid medication dosage. Personal instruction is given. For your information, consider the following:  Ren Allred is an excellent site that will give you a list of approved blood pressure devices  [employer] is an excellent site that will teach you how to take accurate bp measurements at home. Significant weight loss can result in a drop of 5-10mm blood pressure. A DASH diet (see bookstore or go to www.[employer] and print DASH diet) will lower 8-14mm blood pressure. Salt restriction will lower your bp 2-8mm. Lowering alcohol consumption to moderate use will lower your pressure 2-4mm. Continue efforts to maintain an exercise regimen. Normal blood pressure target is now 120/80. Stage 1 or \"pre-hypertension\" or \"high normal hypertension\" is 130-139/80-89. We sometimes begin treatment with medications. Stage 2 is >140/90 which is when we always begin treatment with medications. For high risk patients such as those with heart disease, a history of stents, angioplasty, heart attacks, strokes, diabetes and chronic kidney disease,your blood pressure goal is 130/80. For lower risk patients without the high risk categories, your goal for blood pressure is 139/89. Unless you are older than 72years old we may try for a systolic bp of 394 or we will accept higher pressures if the lower pressures are not tolerated. 1. Essential hypertension  The following orders have not been finalized:  -     metoprolol succinate (TOPROL XL) 50 MG extended release tablet  -     losartan (COZAAR) 50 MG tablet  2.  Acquired hypothyroidism  The following orders have not been finalized:  -     levothyroxine (SYNTHROID) 50 MCG tablet    Followup:  Return for 6 mo for bp recheck, late Oct for mwv.

## 2022-09-12 RX ORDER — AZELASTINE 1 MG/ML
1 SPRAY, METERED NASAL 2 TIMES DAILY
Qty: 30 ML | Refills: 0 | Status: SHIPPED | OUTPATIENT
Start: 2022-09-12

## 2022-09-12 NOTE — TELEPHONE ENCOUNTER
Pt went to urgent care for runny nose and sore throat, was told it was allergies, Pt states that it is not allergies but a cold. Neg for covid, pt requesting something to be sent in for runny nose.

## 2022-10-26 ENCOUNTER — OFFICE VISIT (OUTPATIENT)
Dept: FAMILY MEDICINE CLINIC | Facility: CLINIC | Age: 82
End: 2022-10-26
Payer: MEDICARE

## 2022-10-26 VITALS
HEART RATE: 60 BPM | WEIGHT: 92 LBS | DIASTOLIC BLOOD PRESSURE: 64 MMHG | SYSTOLIC BLOOD PRESSURE: 110 MMHG | OXYGEN SATURATION: 97 % | BODY MASS INDEX: 16.69 KG/M2

## 2022-10-26 DIAGNOSIS — Z00.00 MEDICARE ANNUAL WELLNESS VISIT, SUBSEQUENT: Primary | Chronic | ICD-10-CM

## 2022-10-26 DIAGNOSIS — Z71.89 ADVANCED CARE PLANNING/COUNSELING DISCUSSION: Chronic | ICD-10-CM

## 2022-10-26 PROCEDURE — 3078F DIAST BP <80 MM HG: CPT | Performed by: FAMILY MEDICINE

## 2022-10-26 PROCEDURE — 1123F ACP DISCUSS/DSCN MKR DOCD: CPT | Performed by: FAMILY MEDICINE

## 2022-10-26 PROCEDURE — 99497 ADVNCD CARE PLAN 30 MIN: CPT | Performed by: FAMILY MEDICINE

## 2022-10-26 PROCEDURE — G0439 PPPS, SUBSEQ VISIT: HCPCS | Performed by: FAMILY MEDICINE

## 2022-10-26 PROCEDURE — G8484 FLU IMMUNIZE NO ADMIN: HCPCS | Performed by: FAMILY MEDICINE

## 2022-10-26 PROCEDURE — 3074F SYST BP LT 130 MM HG: CPT | Performed by: FAMILY MEDICINE

## 2022-10-26 ASSESSMENT — PATIENT HEALTH QUESTIONNAIRE - PHQ9
SUM OF ALL RESPONSES TO PHQ QUESTIONS 1-9: 2
SUM OF ALL RESPONSES TO PHQ QUESTIONS 1-9: 2
3. TROUBLE FALLING OR STAYING ASLEEP: 0
5. POOR APPETITE OR OVEREATING: 1
10. IF YOU CHECKED OFF ANY PROBLEMS, HOW DIFFICULT HAVE THESE PROBLEMS MADE IT FOR YOU TO DO YOUR WORK, TAKE CARE OF THINGS AT HOME, OR GET ALONG WITH OTHER PEOPLE: 0
9. THOUGHTS THAT YOU WOULD BE BETTER OFF DEAD, OR OF HURTING YOURSELF: 0
1. LITTLE INTEREST OR PLEASURE IN DOING THINGS: 0
8. MOVING OR SPEAKING SO SLOWLY THAT OTHER PEOPLE COULD HAVE NOTICED. OR THE OPPOSITE, BEING SO FIGETY OR RESTLESS THAT YOU HAVE BEEN MOVING AROUND A LOT MORE THAN USUAL: 0
SUM OF ALL RESPONSES TO PHQ QUESTIONS 1-9: 2
2. FEELING DOWN, DEPRESSED OR HOPELESS: 1
6. FEELING BAD ABOUT YOURSELF - OR THAT YOU ARE A FAILURE OR HAVE LET YOURSELF OR YOUR FAMILY DOWN: 0
SUM OF ALL RESPONSES TO PHQ QUESTIONS 1-9: 2
SUM OF ALL RESPONSES TO PHQ9 QUESTIONS 1 & 2: 1
7. TROUBLE CONCENTRATING ON THINGS, SUCH AS READING THE NEWSPAPER OR WATCHING TELEVISION: 0

## 2022-10-26 ASSESSMENT — LIFESTYLE VARIABLES
HOW MANY STANDARD DRINKS CONTAINING ALCOHOL DO YOU HAVE ON A TYPICAL DAY: PATIENT DOES NOT DRINK
HOW OFTEN DO YOU HAVE A DRINK CONTAINING ALCOHOL: NEVER

## 2022-10-26 NOTE — PROGRESS NOTES
Medicare Annual Wellness Visit    Chet Cooks is here for Medicare AWV    Assessment & Plan   Medicare annual wellness visit, subsequent  Advanced care planning/counseling discussion    Recommendations for Preventive Services Due: see orders and patient instructions/AVS.  Recommended screening schedule for the next 5-10 years is provided to the patient in written form: see Patient Instructions/AVS.  I also wanted to take advantage of their presence here today to complete an   Advanced Care Planning which is voluntary (at discretion of the patient but I have informed them that this service has been reccommended as a worthwhile service). No follow-ups on file. Subjective       Patient's complete Health Risk Assessment and screening values have been reviewed and are found in Flowsheets. The following problems were reviewed today and where indicated follow up appointments were made and/or referrals ordered.     Positive Risk Factor Screenings with Interventions:             General Health and ACP:  General  In general, how would you say your health is?: Good  In the past 7 days, have you experienced any of the following: New or Increased Pain, New or Increased Fatigue, Loneliness, Social Isolation, Stress or Anger?: (!) Yes  Select all that apply: (!) Loneliness, Stress  Do you get the social and emotional support that you need?: Yes  Do you have a Living Will?: (!) No    Advance Directives       Power of  Living Will ACP-Advance Directive ACP-Power of     Not on File Not on File Not on File Not on File          General Health Risk Interventions:  No Living Will: Advance Care Planning addressed with patient today    Health Habits/Nutrition:  Physical Activity: Sufficiently Active    Days of Exercise per Week: 6 days    Minutes of Exercise per Session: 70 min     Have you lost any weight without trying in the past 3 months?: (!) Yes     Have you seen the dentist within the past year?: (!) No  Health Habits/Nutrition Interventions:  Dental exam overdue:  shes aware of,  just              Objective   Vitals:    10/26/22 0857   BP: 110/64   Pulse: 60   SpO2: 97%   Weight: 92 lb (41.7 kg)      Body mass index is 16.69 kg/m². Allergies   Allergen Reactions    Azithromycin Rash    Levofloxacin Anxiety     Prior to Visit Medications    Medication Sig Taking? Authorizing Provider   azelastine (ASTELIN) 0.1 % nasal spray 1 spray by Nasal route 2 times daily Use in each nostril as directed Yes Tanner Light MD   levothyroxine (SYNTHROID) 50 MCG tablet Take 1 tablet by mouth every morning (before breakfast) Yes Tanner Light MD   losartan (COZAAR) 50 MG tablet Take 1 tablet by mouth daily Yes Tanner Light MD   metoprolol succinate (TOPROL XL) 50 MG extended release tablet Take 1 tablet by mouth daily Yes Tanner Light MD   Biotin 1 MG CAPS Take by mouth daily Yes Ar Automatic Reconciliation   vitamin D (CHOLECALCIFEROL) 25 MCG (1000 UT) TABS tablet Take 1,000 Units by mouth Yes Ar Automatic Reconciliation     Allergies   Allergen Reactions    Azithromycin Rash    Levofloxacin Anxiety     PMH, MEDS reviewed     Objective:   Blood pressure 110/64, pulse 60, weight 92 lb (41.7 kg), SpO2 97 %. General- Pleasant and no distress  Psych- alert and oriented to person, place and time  Mood and affect are appropriate to situation  Patient is the designated person who is capable of making the decisions we will discuss    Some of today's visit spent counseling for [address] We mainly discuss future care decisions that need to be made or will soon need to be made. I want to emphasize that you need to let others know about your care preferences including your family members and maybe even a close friend or neighbor you trust.  I have explained what an advanced care directive is and will give you more information at your exit today on your out-processing paper.   Most of these wishes will require completion of standard forms so be prepared to sit down and fill those out. You will then want to keep copies in your home, probably a copy with a close relative, and one in your lock-box. You will want to include your short term philosophy of treatment options that you are comfortable with as well as the more important preferences for long-term goals and choices for care  It is important that you include the types of care that you will prefer such as aggressive surgeries or radiation/chemotherapy should you contract a cancer; whether you want to be hospitalized for inoperable cancers; whether you decide to be a \"no code\"or similar decision to avoid \"heroic\" measures such as cpr, intubation, feeding tubes etc.    Also your feelings about Palliative Care in your home should you have a terminal disease OR Palliative Care in a separate facility of your choice. Comfort level is something you must decide on and inform a family member your desires for that. The absolute bare minimum of what you need to have is a Living Will and a [address]. A \"health care power of \" is a document that allows you to appoint someone to make your health care decisions in the event that you are unable to to so. You should also appoint a backup person in the event your first choice is not available. This document can be printed from the following website and completed free of charge without the services of an :  http://aging.[place].gov/SiteCollectionDocuments/S/[email or web address]. pdf    A \"Living Will\" is a document that gives you the ability to state your wishes in writing about end of life health care decisions. It allows you to choose if you will receive CPR in the event of a life threatening emergency.   It also allows you to choose or refuse specific emergency treatments such as chest compressions, artificial respirations, emergency medications, a mechanical ventilator and/or a tube feeding. This document can be prepared in the privacy of your own home and notarized at your bank. The Living Will form can be printed out at this website: http://aging.[place].gov/SiteCollectionDocuments/L/[email or web address]%20%28Bar%29. .pdf    Because of the [address] pandemic, physicians are learning of the importance of speaking with our patients regarding their preferences for end-of-life medical care and other measures aimed at prolonging health and extending life. The importance of understanding your values and goals if you were to develop a serious illness requiring hospitalization, mechanical ventilation, or CPR not limited to something like COVID-19, but influenza, and other serious illnesses is important so I want to document your preferences for the following:  Who is your Medical Decision Maker should you become incapacitated?son Muriel Seabrook  CPR preference?unsure at this time  Ventilator preference?unsure at this time  Hospitalization preference? Peter Sergorome Kylie may bring these completed forms to the office to be scanned into your medical record, but it is more important that you give a copy to each of your family members, discuss them with your chosen representative, and let them know where the original is kept. Face to Face time pertaining to the Advanced Care dialogue only (not the other portions of the office visit related to evaluation and management of medical problems) with the patient,  first 30 minutes; specifically, the approximate time I spent on this today was right at 16-17minutes. Assessment/Plan:     1. Medicare annual wellness visit, subsequent    2.  Advanced care planning/counseling discussion        CareTeam (Including outside providers/suppliers regularly involved in providing care):   Patient Care Team:  James Carbajal MD as PCP - Jayme Fischer MD as PCP - [facility] Empaneled Provider     Reviewed and updated this visit:  Tobacco  Allergies  Meds  Med Hx  Surg Hx  Soc Hx  Fam Hx

## 2022-10-26 NOTE — PATIENT INSTRUCTIONS
Personalized Preventive Plan for Krista Vásquez - 10/26/2022  Medicare offers a range of preventive health benefits. Some of the tests and screenings are paid in full while other may be subject to a deductible, co-insurance, and/or copay. Some of these benefits include a comprehensive review of your medical history including lifestyle, illnesses that may run in your family, and various assessments and screenings as appropriate. After reviewing your medical record and screening and assessments performed today your provider may have ordered immunizations, labs, imaging, and/or referrals for you. A list of these orders (if applicable) as well as your Preventive Care list are included within your After Visit Summary for your review. Other Preventive Recommendations:    A preventive eye exam performed by an eye specialist is recommended every 1-2 years to screen for glaucoma; cataracts, macular degeneration, and other eye disorders. A preventive dental visit is recommended every 6 months. Try to get at least 150 minutes of exercise per week or 10,000 steps per day on a pedometer . Order or download the FREE \"Exercise & Physical Activity: Your Everyday Guide\" from [employer]. Call 9-266.298.8327 or search [employer] online. You need 3837-0331 mg of calcium and 0838-8416 IU of vitamin D per day. It is possible to meet your calcium requirement with diet alone, but a vitamin D supplement is usually necessary to meet this goal.  When exposed to the sun, use a sunscreen that protects against both UVA and UVB radiation with an SPF of 30 or greater. Reapply every 2 to 3 hours or after sweating, drying off with a towel, or swimming. Always wear a seat belt when traveling in a car. Always wear a helmet when riding a bicycle or motorcycle.   I want to emphasize that you need to let others know about your care preferences including your family members and maybe even a close friend or neighbor you trust.  I have explained what an advanced care directive is and will give you more information at your exit today on your out-processing paper. Most of these wishes will require completion of standard forms so be prepared to sit down and fill those out. You will then want to keep copies in your home, probably a copy with a close relative, and one in your lock-box. You will want to include your short term philosophy of treatment options that you are comfortable with as well as the more important preferences for long-term goals and choices for care  It is important that you include the types of care that you will prefer such as aggressive surgeries or radiation/chemotherapy should you contract a cancer; whether you want to be hospitalized for inoperable cancers; whether you decide to be a \"no code\"or similar decision to avoid \"heroic\" measures such as cpr, intubation, feeding tubes etc.    Also your feelings about Palliative Care in your home should you have a terminal disease OR Palliative Care in a separate facility of your choice. Comfort level is something you must decide on and inform a family member your desires for that. The absolute bare minimum of what you need to have is a Living Will and a [address]. A \"health care power of \" is a document that allows you to appoint someone to make your health care decisions in the event that you are unable to to so. You should also appoint a backup person in the event your first choice is not available. This document can be printed from the following website and completed free of charge without the services of an :  http://aging.[place].gov/SiteCollectionDocuments/S/[facility] for [facility]al HealthPowerOfAttorney. pdf    A \"Living Will\" is a document that gives you the ability to state your wishes in writing about end of life health care decisions.   It allows you to choose if you will receive CPR in the event of a life threatening emergency. It also allows you to choose or refuse specific emergency treatments such as chest compressions, artificial respirations, emergency medications, a mechanical ventilator and/or a tube feeding. This document can be prepared in the privacy of your own home and notarized at your bank. The Living Will form can be printed out at this website: http://aging.sc.gov/SiteCollectionDocuments/L/DvayvrZyrp1296%20%28Bar%29. .pdf    Because of the [place] pandemic, physicians are learning of the importance of speaking with our patients regarding their preferences for end-of-life medical care and other measures aimed at prolonging health and extending life. The importance of understanding your values and goals if you were to develop a serious illness requiring hospitalization, mechanical ventilation, or CPR not limited to something like COVID-19, but influenza, and other serious illnesses is important so I want to document your preferences for the following:  Who is your Medical Decision Maker should you become incapacitated?son Jose Gil  CPR preference?unsure at this time  Ventilator preference?unsure at this time  Hospitalization preference? Tarun Espinal may bring these completed forms to the office to be scanned into your medical record, but it is more important that you give a copy to each of your family members, discuss them with your chosen representative, and let them know where the original is kept.

## 2022-11-25 ENCOUNTER — TELEPHONE (OUTPATIENT)
Dept: FAMILY MEDICINE CLINIC | Facility: CLINIC | Age: 82
End: 2022-11-25

## 2022-11-25 NOTE — TELEPHONE ENCOUNTER
Has cough and congestion for a few days and very fatigue. Went to  and was given Flonase but feels as if everything is in her chest. Yesterday temp was 101 and today 99.2.

## 2022-11-28 ENCOUNTER — TELEPHONE (OUTPATIENT)
Dept: FAMILY MEDICINE CLINIC | Facility: CLINIC | Age: 82
End: 2022-11-28

## 2022-11-28 DIAGNOSIS — R05.1 ACUTE COUGH: Primary | ICD-10-CM

## 2022-11-28 RX ORDER — BENZONATATE 100 MG/1
100 CAPSULE ORAL 3 TIMES DAILY PRN
Qty: 45 CAPSULE | Refills: 2 | Status: SHIPPED | OUTPATIENT
Start: 2022-11-28 | End: 2022-12-05

## 2022-12-02 ENCOUNTER — TELEPHONE (OUTPATIENT)
Dept: FAMILY MEDICINE CLINIC | Facility: CLINIC | Age: 82
End: 2022-12-02

## 2022-12-02 NOTE — TELEPHONE ENCOUNTER
Patient called stating shes been sick over a week with a cough and was prescribed antibiotics and she's having a hard time with them due to aptient getting nausea and causing her belly ache and diarreah . Pt states she was also prescribed AZ pack?  But broke out with  hives but would like something else and wants to know if shes still consider contagious

## 2022-12-05 NOTE — TELEPHONE ENCOUNTER
Patient notified via VM. Advised to call back with any questions or if not feeling better by midweek.

## 2022-12-09 ENCOUNTER — TELEPHONE (OUTPATIENT)
Dept: FAMILY MEDICINE CLINIC | Facility: CLINIC | Age: 82
End: 2022-12-09

## 2022-12-09 NOTE — TELEPHONE ENCOUNTER
Pt called requesting to see MD today . Pt told me she was vomiting and very lethargic just kind of dragging and does not know if is due to her  passing away 3 months ago.  PT then ask if MD Jim Steele can give her feedback on her next appointment and see if maybe she needs different medication to help her with her grief pt will be seen next week instead and does not want medicine

## 2022-12-13 ENCOUNTER — OFFICE VISIT (OUTPATIENT)
Dept: FAMILY MEDICINE CLINIC | Facility: CLINIC | Age: 82
End: 2022-12-13
Payer: MEDICARE

## 2022-12-13 VITALS
HEART RATE: 64 BPM | SYSTOLIC BLOOD PRESSURE: 120 MMHG | OXYGEN SATURATION: 98 % | BODY MASS INDEX: 16.76 KG/M2 | DIASTOLIC BLOOD PRESSURE: 60 MMHG | WEIGHT: 92.4 LBS

## 2022-12-13 DIAGNOSIS — F32.1 MODERATE SINGLE CURRENT EPISODE OF MAJOR DEPRESSIVE DISORDER (HCC): Primary | ICD-10-CM

## 2022-12-13 DIAGNOSIS — F41.9 ANXIETY: ICD-10-CM

## 2022-12-13 DIAGNOSIS — F43.21 GRIEF REACTION: ICD-10-CM

## 2022-12-13 PROCEDURE — 1036F TOBACCO NON-USER: CPT | Performed by: FAMILY MEDICINE

## 2022-12-13 PROCEDURE — 1123F ACP DISCUSS/DSCN MKR DOCD: CPT | Performed by: FAMILY MEDICINE

## 2022-12-13 PROCEDURE — 3078F DIAST BP <80 MM HG: CPT | Performed by: FAMILY MEDICINE

## 2022-12-13 PROCEDURE — 3074F SYST BP LT 130 MM HG: CPT | Performed by: FAMILY MEDICINE

## 2022-12-13 PROCEDURE — 99213 OFFICE O/P EST LOW 20 MIN: CPT | Performed by: FAMILY MEDICINE

## 2022-12-13 PROCEDURE — G8484 FLU IMMUNIZE NO ADMIN: HCPCS | Performed by: FAMILY MEDICINE

## 2022-12-13 PROCEDURE — G8427 DOCREV CUR MEDS BY ELIG CLIN: HCPCS | Performed by: FAMILY MEDICINE

## 2022-12-13 PROCEDURE — 1090F PRES/ABSN URINE INCON ASSESS: CPT | Performed by: FAMILY MEDICINE

## 2022-12-13 PROCEDURE — G8399 PT W/DXA RESULTS DOCUMENT: HCPCS | Performed by: FAMILY MEDICINE

## 2022-12-13 PROCEDURE — G8419 CALC BMI OUT NRM PARAM NOF/U: HCPCS | Performed by: FAMILY MEDICINE

## 2022-12-13 RX ORDER — BUSPIRONE HYDROCHLORIDE 10 MG/1
10 TABLET ORAL
COMMUNITY
Start: 2022-11-19 | End: 2022-12-13 | Stop reason: SDUPTHER

## 2022-12-13 RX ORDER — BUSPIRONE HYDROCHLORIDE 15 MG/1
15 TABLET ORAL 3 TIMES DAILY
Qty: 90 TABLET | Refills: 5 | Status: SHIPPED | OUTPATIENT
Start: 2022-12-13

## 2022-12-13 ASSESSMENT — PATIENT HEALTH QUESTIONNAIRE - PHQ9
SUM OF ALL RESPONSES TO PHQ QUESTIONS 1-9: 4
2. FEELING DOWN, DEPRESSED OR HOPELESS: 0
8. MOVING OR SPEAKING SO SLOWLY THAT OTHER PEOPLE COULD HAVE NOTICED. OR THE OPPOSITE, BEING SO FIGETY OR RESTLESS THAT YOU HAVE BEEN MOVING AROUND A LOT MORE THAN USUAL: 0
1. LITTLE INTEREST OR PLEASURE IN DOING THINGS: 0
4. FEELING TIRED OR HAVING LITTLE ENERGY: 0
3. TROUBLE FALLING OR STAYING ASLEEP: 0
5. POOR APPETITE OR OVEREATING: 2
2. FEELING DOWN, DEPRESSED OR HOPELESS: 1
9. THOUGHTS THAT YOU WOULD BE BETTER OFF DEAD, OR OF HURTING YOURSELF: 0
SUM OF ALL RESPONSES TO PHQ QUESTIONS 1-9: 0
10. IF YOU CHECKED OFF ANY PROBLEMS, HOW DIFFICULT HAVE THESE PROBLEMS MADE IT FOR YOU TO DO YOUR WORK, TAKE CARE OF THINGS AT HOME, OR GET ALONG WITH OTHER PEOPLE: 1
SUM OF ALL RESPONSES TO PHQ9 QUESTIONS 1 & 2: 2
6. FEELING BAD ABOUT YOURSELF - OR THAT YOU ARE A FAILURE OR HAVE LET YOURSELF OR YOUR FAMILY DOWN: 0
SUM OF ALL RESPONSES TO PHQ QUESTIONS 1-9: 0
SUM OF ALL RESPONSES TO PHQ QUESTIONS 1-9: 4
SUM OF ALL RESPONSES TO PHQ9 QUESTIONS 1 & 2: 0
SUM OF ALL RESPONSES TO PHQ QUESTIONS 1-9: 4
SUM OF ALL RESPONSES TO PHQ QUESTIONS 1-9: 0
SUM OF ALL RESPONSES TO PHQ QUESTIONS 1-9: 4
7. TROUBLE CONCENTRATING ON THINGS, SUCH AS READING THE NEWSPAPER OR WATCHING TELEVISION: 0
1. LITTLE INTEREST OR PLEASURE IN DOING THINGS: 1
SUM OF ALL RESPONSES TO PHQ QUESTIONS 1-9: 0

## 2022-12-13 NOTE — PROGRESS NOTES
Subjective:   Ze Doyle is a 78 y.o. female presents today for depression and grief. Wellbutrin made her jittery and dizzy could not tolerate it. Zoloft caused severe anxiety she cannot tolerate that. She is currently taking BuSpar. Still struggles with loneliness and grief. 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 Admits she does not get out and friends of told her she needs to leave the home or  PHQ-9 Total Score: 4 (12/13/2022  3:31 PM)  Thoughts that you would be better off dead, or of hurting yourself in some way: 0 (12/13/2022  3:31 PM)      Allergies   Allergen Reactions    Azithromycin Rash    Levofloxacin Anxiety     PMH, MEDS reviewed     Objective:   Blood pressure 120/60, pulse 64, weight 92 lb 6.4 oz (41.9 kg), SpO2 98 %. General- Pleasant and no distress  Psych- alert and oriented to person, place and time  Mood and affect are appropriate to situation  Musculoskeletal - Gait and station examination reveals mid-position with no abnormalities. Neurological- grossly intact. Much of today's visit spent counseling with review of symptoms, disposition, prognosis, and risk/benefit of treatment plan options in addition to limited behavioral counseling and recommendations, and the counseling has included my impressions of any previous diagnostic results, but also the importance of compliance and follow up, and we have talked about risk factor reduction where appropriate      Assessment/Plan:     1. Moderate single current episode of major depressive disorder ([address]    2. Grief reaction    3. Anxiety        1. Moderate single current episode of major depressive disorder ([address]  2. Grief reaction  3. Anxiety  -     busPIRone (BUSPAR) 15 MG tablet; Take 15 mg by mouth 3 times daily, Disp-90 tablet, R-5Normal    To call if notions of suicide develop. Increase exercise, avoid alcohol and other depressing drugs. Will increase the buspar    Return if symptoms worsen or fail to improve.

## 2023-01-23 ENCOUNTER — OFFICE VISIT (OUTPATIENT)
Dept: FAMILY MEDICINE CLINIC | Facility: CLINIC | Age: 83
End: 2023-01-23
Payer: MEDICARE

## 2023-01-23 ENCOUNTER — HOSPITAL ENCOUNTER (OUTPATIENT)
Dept: GENERAL RADIOLOGY | Age: 83
Discharge: HOME | End: 2023-01-26
Payer: MEDICARE

## 2023-01-23 VITALS
OXYGEN SATURATION: 96 % | HEART RATE: 88 BPM | SYSTOLIC BLOOD PRESSURE: 120 MMHG | DIASTOLIC BLOOD PRESSURE: 67 MMHG | BODY MASS INDEX: 17.71 KG/M2 | WEIGHT: 97.6 LBS

## 2023-01-23 DIAGNOSIS — F32.1 MODERATE SINGLE CURRENT EPISODE OF MAJOR DEPRESSIVE DISORDER (HCC): ICD-10-CM

## 2023-01-23 DIAGNOSIS — M54.2 NECK PAIN: ICD-10-CM

## 2023-01-23 DIAGNOSIS — J47.1 BRONCHIECTASIS WITH (ACUTE) EXACERBATION (HCC): ICD-10-CM

## 2023-01-23 DIAGNOSIS — M54.2 NECK PAIN: Primary | ICD-10-CM

## 2023-01-23 PROCEDURE — 1036F TOBACCO NON-USER: CPT | Performed by: FAMILY MEDICINE

## 2023-01-23 PROCEDURE — 99213 OFFICE O/P EST LOW 20 MIN: CPT | Performed by: FAMILY MEDICINE

## 2023-01-23 PROCEDURE — G8484 FLU IMMUNIZE NO ADMIN: HCPCS | Performed by: FAMILY MEDICINE

## 2023-01-23 PROCEDURE — G8399 PT W/DXA RESULTS DOCUMENT: HCPCS | Performed by: FAMILY MEDICINE

## 2023-01-23 PROCEDURE — G8419 CALC BMI OUT NRM PARAM NOF/U: HCPCS | Performed by: FAMILY MEDICINE

## 2023-01-23 PROCEDURE — G8427 DOCREV CUR MEDS BY ELIG CLIN: HCPCS | Performed by: FAMILY MEDICINE

## 2023-01-23 PROCEDURE — 1123F ACP DISCUSS/DSCN MKR DOCD: CPT | Performed by: FAMILY MEDICINE

## 2023-01-23 PROCEDURE — 72040 X-RAY EXAM NECK SPINE 2-3 VW: CPT

## 2023-01-23 PROCEDURE — 1090F PRES/ABSN URINE INCON ASSESS: CPT | Performed by: FAMILY MEDICINE

## 2023-01-23 PROCEDURE — 3078F DIAST BP <80 MM HG: CPT | Performed by: FAMILY MEDICINE

## 2023-01-23 PROCEDURE — 3074F SYST BP LT 130 MM HG: CPT | Performed by: FAMILY MEDICINE

## 2023-01-23 ASSESSMENT — PATIENT HEALTH QUESTIONNAIRE - PHQ9
3. TROUBLE FALLING OR STAYING ASLEEP: 0
SUM OF ALL RESPONSES TO PHQ QUESTIONS 1-9: 0
SUM OF ALL RESPONSES TO PHQ QUESTIONS 1-9: 0
6. FEELING BAD ABOUT YOURSELF - OR THAT YOU ARE A FAILURE OR HAVE LET YOURSELF OR YOUR FAMILY DOWN: 0
4. FEELING TIRED OR HAVING LITTLE ENERGY: 0
8. MOVING OR SPEAKING SO SLOWLY THAT OTHER PEOPLE COULD HAVE NOTICED. OR THE OPPOSITE, BEING SO FIGETY OR RESTLESS THAT YOU HAVE BEEN MOVING AROUND A LOT MORE THAN USUAL: 0
5. POOR APPETITE OR OVEREATING: 0
SUM OF ALL RESPONSES TO PHQ QUESTIONS 1-9: 0
1. LITTLE INTEREST OR PLEASURE IN DOING THINGS: 0
SUM OF ALL RESPONSES TO PHQ9 QUESTIONS 1 & 2: 0
7. TROUBLE CONCENTRATING ON THINGS, SUCH AS READING THE NEWSPAPER OR WATCHING TELEVISION: 0
9. THOUGHTS THAT YOU WOULD BE BETTER OFF DEAD, OR OF HURTING YOURSELF: 0
2. FEELING DOWN, DEPRESSED OR HOPELESS: 0
10. IF YOU CHECKED OFF ANY PROBLEMS, HOW DIFFICULT HAVE THESE PROBLEMS MADE IT FOR YOU TO DO YOUR WORK, TAKE CARE OF THINGS AT HOME, OR GET ALONG WITH OTHER PEOPLE: 0
SUM OF ALL RESPONSES TO PHQ QUESTIONS 1-9: 0

## 2023-01-23 NOTE — PROGRESS NOTES
Subjective:   Laurel Keller is a 78 y.o. female presents today for frustration with recent COVID about 10 days ago. Since that time had a little bit of fatigue but it may have exacerbated some neck pain. Neck pain comes and goes. In certain positions. Apparently went to an urgent care. No x-ray was done. She cannot really take ibuprofen. Worse with certain movements. Still grieving the loss of her . BuSpar helps for her anxiety but she stays nervous a lot she admits    Allergies   Allergen Reactions    Azithromycin Rash    Levofloxacin Anxiety     PMH, MEDS reviewed   PHQ-9 Total Score: 0 (1/23/2023 10:24 AM)  Thoughts that you would be better off dead, or of hurting yourself in some way: 0 (1/23/2023 10:24 AM)    Objective:   Blood pressure 120/67, pulse 88, weight 97 lb 9.6 oz (44.3 kg), SpO2 96 %. General- Pleasant and no distress  Psych- alert and oriented to person, place and time  Mood and affect are appropriate to situation  Musculoskeletal - Gait and station examination reveals mid-position with no abnormalities. Neurological- grossly intact. rrr s mrg.   bcta    Assessment/Plan:     1. Neck pain    2. Moderate single current episode of major depressive disorder ([address]    3. Bronchiectasis with (acute) exacerbation ([address]         1. Neck pain  -     XR CERVICAL SPINE (2-3 VIEWS); Future  2. Moderate single current episode of major depressive disorder [facility])  Assessment & Plan:   Borderline controlled, continue current medications and continue current treatment plan    3. 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 Followup:   No follow-ups on file.

## 2023-01-27 ENCOUNTER — TELEPHONE (OUTPATIENT)
Dept: FAMILY MEDICINE CLINIC | Facility: CLINIC | Age: 83
End: 2023-01-27

## 2023-01-27 NOTE — TELEPHONE ENCOUNTER
Pt called to request some medicine for her feet? . Pt states she cant sleep because of her feet and her friend told the patient theres some medicine that can help her and to ask PCP a little more about it.

## 2023-02-15 ENCOUNTER — OFFICE VISIT (OUTPATIENT)
Dept: FAMILY MEDICINE CLINIC | Facility: CLINIC | Age: 83
End: 2023-02-15
Payer: MEDICARE

## 2023-02-15 VITALS
HEART RATE: 74 BPM | OXYGEN SATURATION: 98 % | SYSTOLIC BLOOD PRESSURE: 147 MMHG | DIASTOLIC BLOOD PRESSURE: 70 MMHG | BODY MASS INDEX: 17.02 KG/M2 | WEIGHT: 93.8 LBS

## 2023-02-15 DIAGNOSIS — F41.9 ANXIETY: ICD-10-CM

## 2023-02-15 DIAGNOSIS — E03.9 ACQUIRED HYPOTHYROIDISM: ICD-10-CM

## 2023-02-15 DIAGNOSIS — I10 ESSENTIAL HYPERTENSION: Primary | ICD-10-CM

## 2023-02-15 LAB
ANION GAP SERPL CALC-SCNC: 8 MMOL/L (ref 2–11)
BUN SERPL-MCNC: 22 MG/DL (ref 8–23)
CALCIUM SERPL-MCNC: 10 MG/DL (ref 8.3–10.4)
CHLORIDE SERPL-SCNC: 104 MMOL/L (ref 101–110)
CO2 SERPL-SCNC: 27 MMOL/L (ref 21–32)
CREAT SERPL-MCNC: 1.1 MG/DL (ref 0.6–1)
GLUCOSE SERPL-MCNC: 91 MG/DL (ref 65–100)
POTASSIUM SERPL-SCNC: 4.6 MMOL/L (ref 3.5–5.1)
SODIUM SERPL-SCNC: 139 MMOL/L (ref 133–143)

## 2023-02-15 PROCEDURE — G8419 CALC BMI OUT NRM PARAM NOF/U: HCPCS | Performed by: FAMILY MEDICINE

## 2023-02-15 PROCEDURE — 3077F SYST BP >= 140 MM HG: CPT | Performed by: FAMILY MEDICINE

## 2023-02-15 PROCEDURE — 1090F PRES/ABSN URINE INCON ASSESS: CPT | Performed by: FAMILY MEDICINE

## 2023-02-15 PROCEDURE — 99214 OFFICE O/P EST MOD 30 MIN: CPT | Performed by: FAMILY MEDICINE

## 2023-02-15 PROCEDURE — 3078F DIAST BP <80 MM HG: CPT | Performed by: FAMILY MEDICINE

## 2023-02-15 PROCEDURE — G8484 FLU IMMUNIZE NO ADMIN: HCPCS | Performed by: FAMILY MEDICINE

## 2023-02-15 PROCEDURE — G8427 DOCREV CUR MEDS BY ELIG CLIN: HCPCS | Performed by: FAMILY MEDICINE

## 2023-02-15 PROCEDURE — 1036F TOBACCO NON-USER: CPT | Performed by: FAMILY MEDICINE

## 2023-02-15 PROCEDURE — G8399 PT W/DXA RESULTS DOCUMENT: HCPCS | Performed by: FAMILY MEDICINE

## 2023-02-15 PROCEDURE — 1123F ACP DISCUSS/DSCN MKR DOCD: CPT | Performed by: FAMILY MEDICINE

## 2023-02-15 RX ORDER — LOSARTAN POTASSIUM 50 MG/1
50 TABLET ORAL DAILY
Qty: 90 TABLET | Refills: 1 | Status: SHIPPED | OUTPATIENT
Start: 2023-02-15

## 2023-02-15 RX ORDER — BUSPIRONE HYDROCHLORIDE 15 MG/1
15 TABLET ORAL 3 TIMES DAILY
Qty: 270 TABLET | Refills: 1 | Status: SHIPPED | OUTPATIENT
Start: 2023-02-15

## 2023-02-15 RX ORDER — LEVOTHYROXINE SODIUM 0.05 MG/1
50 TABLET ORAL
Qty: 90 TABLET | Refills: 1 | Status: SHIPPED | OUTPATIENT
Start: 2023-02-15

## 2023-02-15 RX ORDER — METOPROLOL SUCCINATE 50 MG/1
50 TABLET, EXTENDED RELEASE ORAL DAILY
Qty: 90 TABLET | Refills: 1 | Status: SHIPPED | OUTPATIENT
Start: 2023-02-15

## 2023-02-15 SDOH — ECONOMIC STABILITY: INCOME INSECURITY: HOW HARD IS IT FOR YOU TO PAY FOR THE VERY BASICS LIKE FOOD, HOUSING, MEDICAL CARE, AND HEATING?: NOT HARD AT ALL

## 2023-02-15 SDOH — ECONOMIC STABILITY: FOOD INSECURITY: WITHIN THE PAST 12 MONTHS, THE FOOD YOU BOUGHT JUST DIDN'T LAST AND YOU DIDN'T HAVE MONEY TO GET MORE.: NEVER TRUE

## 2023-02-15 SDOH — ECONOMIC STABILITY: FOOD INSECURITY: WITHIN THE PAST 12 MONTHS, YOU WORRIED THAT YOUR FOOD WOULD RUN OUT BEFORE YOU GOT MONEY TO BUY MORE.: NEVER TRUE

## 2023-02-15 SDOH — ECONOMIC STABILITY: HOUSING INSECURITY
IN THE LAST 12 MONTHS, WAS THERE A TIME WHEN YOU DID NOT HAVE A STEADY PLACE TO SLEEP OR SLEPT IN A SHELTER (INCLUDING NOW)?: NO

## 2023-02-15 ASSESSMENT — PATIENT HEALTH QUESTIONNAIRE - PHQ9
1. LITTLE INTEREST OR PLEASURE IN DOING THINGS: 0
7. TROUBLE CONCENTRATING ON THINGS, SUCH AS READING THE NEWSPAPER OR WATCHING TELEVISION: 0
3. TROUBLE FALLING OR STAYING ASLEEP: 0
SUM OF ALL RESPONSES TO PHQ QUESTIONS 1-9: 0
10. IF YOU CHECKED OFF ANY PROBLEMS, HOW DIFFICULT HAVE THESE PROBLEMS MADE IT FOR YOU TO DO YOUR WORK, TAKE CARE OF THINGS AT HOME, OR GET ALONG WITH OTHER PEOPLE: 0
8. MOVING OR SPEAKING SO SLOWLY THAT OTHER PEOPLE COULD HAVE NOTICED. OR THE OPPOSITE, BEING SO FIGETY OR RESTLESS THAT YOU HAVE BEEN MOVING AROUND A LOT MORE THAN USUAL: 0
5. POOR APPETITE OR OVEREATING: 0
8. MOVING OR SPEAKING SO SLOWLY THAT OTHER PEOPLE COULD HAVE NOTICED. OR THE OPPOSITE, BEING SO FIGETY OR RESTLESS THAT YOU HAVE BEEN MOVING AROUND A LOT MORE THAN USUAL: 0
SUM OF ALL RESPONSES TO PHQ9 QUESTIONS 1 & 2: 0
6. FEELING BAD ABOUT YOURSELF - OR THAT YOU ARE A FAILURE OR HAVE LET YOURSELF OR YOUR FAMILY DOWN: 0
10. IF YOU CHECKED OFF ANY PROBLEMS, HOW DIFFICULT HAVE THESE PROBLEMS MADE IT FOR YOU TO DO YOUR WORK, TAKE CARE OF THINGS AT HOME, OR GET ALONG WITH OTHER PEOPLE: 0
SUM OF ALL RESPONSES TO PHQ QUESTIONS 1-9: 0
6. FEELING BAD ABOUT YOURSELF - OR THAT YOU ARE A FAILURE OR HAVE LET YOURSELF OR YOUR FAMILY DOWN: 0
9. THOUGHTS THAT YOU WOULD BE BETTER OFF DEAD, OR OF HURTING YOURSELF: 0
7. TROUBLE CONCENTRATING ON THINGS, SUCH AS READING THE NEWSPAPER OR WATCHING TELEVISION: 0
SUM OF ALL RESPONSES TO PHQ QUESTIONS 1-9: 0
SUM OF ALL RESPONSES TO PHQ QUESTIONS 1-9: 0
5. POOR APPETITE OR OVEREATING: 0
3. TROUBLE FALLING OR STAYING ASLEEP: 0
SUM OF ALL RESPONSES TO PHQ QUESTIONS 1-9: 0
1. LITTLE INTEREST OR PLEASURE IN DOING THINGS: 0
SUM OF ALL RESPONSES TO PHQ QUESTIONS 1-9: 0
2. FEELING DOWN, DEPRESSED OR HOPELESS: 0
SUM OF ALL RESPONSES TO PHQ9 QUESTIONS 1 & 2: 0
SUM OF ALL RESPONSES TO PHQ QUESTIONS 1-9: 0
4. FEELING TIRED OR HAVING LITTLE ENERGY: 0
2. FEELING DOWN, DEPRESSED OR HOPELESS: 0
9. THOUGHTS THAT YOU WOULD BE BETTER OFF DEAD, OR OF HURTING YOURSELF: 0
SUM OF ALL RESPONSES TO PHQ QUESTIONS 1-9: 0

## 2023-02-15 NOTE — PROGRESS NOTES
Subjective:  Zuleyka Mayfield is a 78 y.o. female presents today for their semi-annual htn, anxiety, hypothyroid, recheck and refills visit. They are having no side effects and are doing well. Systems review of cardiovascular and pulmonary systems reveal no complaints or pertinent positives. Patient denies any pounding heart beats or rapid heart beat intervals, flushing, panic like attacks, headaches, dizzyness or flushing. They have drug reistant hypertension, on 3 or more different medicaitons including one diuretic- No  PHQ-9 Total Score: 0 (2/15/2023  9:01 AM)  Thoughts that you would be better off dead, or of hurting yourself in some way: 0 (2/15/2023  9:01 AM)    How much are you exercising? Daily   Do you smoke? No  Are you taking your medicines as directed most every day? Yes  Do you follow a low sodium DASH diet or similar high blood pressure diet? No  Do you check your bp at home with an automated blood pressure device? Yes  If you don't have a home bp machine, you should purchase one at home and begin to check your pressure at home on a regular basis. Your blood pressure goal is listed under the \"plan section\" below    Allergies   Allergen Reactions    Azithromycin Rash    Levofloxacin Anxiety      reports that she has never smoked.  She has never used smokeless tobacco.  Current Outpatient Medications   Medication Sig Dispense Refill    metoprolol succinate (TOPROL XL) 50 MG extended release tablet Take 1 tablet by mouth daily 90 tablet 1    losartan (COZAAR) 50 MG tablet Take 1 tablet by mouth daily 90 tablet 1    levothyroxine (SYNTHROID) 50 MCG tablet Take 1 tablet by mouth every morning (before breakfast) 90 tablet 1    busPIRone (BUSPAR) 15 MG tablet Take 15 mg by mouth 3 times daily 270 tablet 1    azelastine (ASTELIN) 0.1 % nasal spray 1 spray by Nasal route 2 times daily Use in each nostril as directed 30 mL 0    Biotin 1 MG CAPS Take by mouth daily      vitamin D (CHOLECALCIFEROL) 25 MCG (1000 UT) TABS tablet Take 1,000 Units by mouth       No current facility-administered medications for this visit. Lab Results   Component Value Date/Time     08/17/2022 09:01 AM    K 4.2 08/17/2022 09:01 AM     08/17/2022 09:01 AM    CO2 30 08/17/2022 09:01 AM    BUN 24 08/17/2022 09:01 AM    CREATININE 1.20 08/17/2022 09:01 AM    GLUCOSE 93 08/17/2022 09:01 AM    CALCIUM 9.6 08/17/2022 09:01 AM          Objective:  Blood pressure (!) 147/70, pulse 74, weight 93 lb 12.8 oz (42.5 kg), SpO2 98 %. Body mass index is 17.02 kg/m². BP Readings from Last 3 Encounters:   02/15/23 (!) 147/70   01/23/23 120/67   12/13/22 120/60     General- Pleasant and no distress  Psych- alert and oriented to person, place and time  Mood and affect are appropriate to situation  Musculoskeletal - Gait and station examination reveals mid-position with no abnormalities. Neurological- grossly intact. rrr s mrg.   bcta  extremeties are without edema, and dp, and pt pulses are intact  No carotid bruits   Fundoscopic exam is: Fundoscopic exam is consistent with arteriolar narrowing and AV nicking as well as some rare microaneurysms     Assessment:  1. Essential hypertension    2. Acquired hypothyroidism    3. Anxiety        Plan:   Prescription drug management occurs today as follows: Bp up today but is an outlier for her also given her age I hesitate to push another medication    Personal instruction is given. For your information, consider the following:  [facility] is an excellent site that will give you a list of approved blood pressure devices  Ariella Almaguer is an excellent site that will teach you how to take accurate bp measurements at home. Significant weight loss can result in a drop of 5-10mm blood pressure. A DASH diet (see bookstore or go to www.[employer].com and print DASH diet) will lower 8-14mm blood pressure. Salt restriction will lower your bp 2-8mm.   Lowering alcohol consumption to moderate use will lower your pressure 2-4mm. Continue efforts to maintain an exercise regimen. Normal blood pressure target is now 120/80. Stage 1 or \"pre-hypertension\" or \"high normal hypertension\" is 130-139/80-89. We sometimes begin treatment with medications. Stage 2 is >140/90 which is when we always begin treatment with medications. For high risk patients such as those with heart disease, a history of stents, angioplasty, heart attacks, strokes, diabetes and chronic kidney disease,your blood pressure goal is 130/80. For lower risk patients without the high risk categories, your goal for blood pressure is 139/89. Unless you are older than 72years old we may try for a systolic bp of 185 or we will accept higher pressures if the lower pressures are not tolerated. 1. Essential hypertension  -     metoprolol succinate (TOPROL XL) 50 MG extended release tablet; Take 1 tablet by mouth daily, Disp-90 tablet, R-1Normal  -     losartan (COZAAR) 50 MG tablet; Take 1 tablet by mouth daily, Disp-90 tablet, R-1Normal  -     Basic Metabolic Panel; Future  2. Acquired hypothyroidism  -     levothyroxine (SYNTHROID) 50 MCG tablet; Take 1 tablet by mouth every morning (before breakfast), Disp-90 tablet, R-1Normal  3. Anxiety  -     busPIRone (BUSPAR) 15 MG tablet; Take 15 mg by mouth 3 times daily, Disp-270 tablet, R-1Normal    Followup:  Return for 6 mo for bp recheck and tsh.

## 2023-03-30 ENCOUNTER — OFFICE VISIT (OUTPATIENT)
Dept: FAMILY MEDICINE CLINIC | Facility: CLINIC | Age: 83
End: 2023-03-30
Payer: MEDICARE

## 2023-03-30 VITALS
BODY MASS INDEX: 17.64 KG/M2 | SYSTOLIC BLOOD PRESSURE: 132 MMHG | WEIGHT: 97.2 LBS | OXYGEN SATURATION: 98 % | HEART RATE: 66 BPM | DIASTOLIC BLOOD PRESSURE: 69 MMHG

## 2023-03-30 DIAGNOSIS — H91.92 DECREASED HEARING OF LEFT EAR: Primary | ICD-10-CM

## 2023-03-30 PROCEDURE — 99213 OFFICE O/P EST LOW 20 MIN: CPT | Performed by: FAMILY MEDICINE

## 2023-03-30 PROCEDURE — 1123F ACP DISCUSS/DSCN MKR DOCD: CPT | Performed by: FAMILY MEDICINE

## 2023-03-30 PROCEDURE — 3078F DIAST BP <80 MM HG: CPT | Performed by: FAMILY MEDICINE

## 2023-03-30 PROCEDURE — G8419 CALC BMI OUT NRM PARAM NOF/U: HCPCS | Performed by: FAMILY MEDICINE

## 2023-03-30 PROCEDURE — G8399 PT W/DXA RESULTS DOCUMENT: HCPCS | Performed by: FAMILY MEDICINE

## 2023-03-30 PROCEDURE — G8484 FLU IMMUNIZE NO ADMIN: HCPCS | Performed by: FAMILY MEDICINE

## 2023-03-30 PROCEDURE — G8427 DOCREV CUR MEDS BY ELIG CLIN: HCPCS | Performed by: FAMILY MEDICINE

## 2023-03-30 PROCEDURE — 1090F PRES/ABSN URINE INCON ASSESS: CPT | Performed by: FAMILY MEDICINE

## 2023-03-30 PROCEDURE — 1036F TOBACCO NON-USER: CPT | Performed by: FAMILY MEDICINE

## 2023-03-30 PROCEDURE — 3075F SYST BP GE 130 - 139MM HG: CPT | Performed by: FAMILY MEDICINE

## 2023-03-30 ASSESSMENT — PATIENT HEALTH QUESTIONNAIRE - PHQ9
SUM OF ALL RESPONSES TO PHQ QUESTIONS 1-9: 0
SUM OF ALL RESPONSES TO PHQ QUESTIONS 1-9: 0
1. LITTLE INTEREST OR PLEASURE IN DOING THINGS: 0
SUM OF ALL RESPONSES TO PHQ QUESTIONS 1-9: 0
10. IF YOU CHECKED OFF ANY PROBLEMS, HOW DIFFICULT HAVE THESE PROBLEMS MADE IT FOR YOU TO DO YOUR WORK, TAKE CARE OF THINGS AT HOME, OR GET ALONG WITH OTHER PEOPLE: 0
9. THOUGHTS THAT YOU WOULD BE BETTER OFF DEAD, OR OF HURTING YOURSELF: 0
8. MOVING OR SPEAKING SO SLOWLY THAT OTHER PEOPLE COULD HAVE NOTICED. OR THE OPPOSITE, BEING SO FIGETY OR RESTLESS THAT YOU HAVE BEEN MOVING AROUND A LOT MORE THAN USUAL: 0
SUM OF ALL RESPONSES TO PHQ QUESTIONS 1-9: 0
6. FEELING BAD ABOUT YOURSELF - OR THAT YOU ARE A FAILURE OR HAVE LET YOURSELF OR YOUR FAMILY DOWN: 0
3. TROUBLE FALLING OR STAYING ASLEEP: 0
SUM OF ALL RESPONSES TO PHQ9 QUESTIONS 1 & 2: 0
4. FEELING TIRED OR HAVING LITTLE ENERGY: 0
5. POOR APPETITE OR OVEREATING: 0
2. FEELING DOWN, DEPRESSED OR HOPELESS: 0
7. TROUBLE CONCENTRATING ON THINGS, SUCH AS READING THE NEWSPAPER OR WATCHING TELEVISION: 0

## 2023-03-30 NOTE — PROGRESS NOTES
Subjective:   Akira Martinez is a 78 y.o. female presents today for referral by the audiologist.  She states she has a ringing in the left ear seems to be a buzzing from the hearing aid on that side however she states that the technician stated that she had wax in her ears and she is here to have that removed. There is no fever, cough, sore throat    Allergies   Allergen Reactions    Azithromycin Rash    Levofloxacin Anxiety     PMH, MEDS reviewed     Objective:   Blood pressure 132/69, pulse 66, weight 97 lb 3.2 oz (44.1 kg), SpO2 98 %. General- Pleasant and no distress  Psych- alert and oriented to person, place and time  Mood and affect are appropriate to situation  Musculoskeletal - Gait and station examination reveals mid-position with no abnormalities. Neurological- grossly intact. rrr s mrg. Bcta  Ears are clear bilaterally. Absolutely no wax in the left canal, right canal perhaps 10 to 15% wax along the periphery certainly nothing obstructing    Assessment/Plan:     1. Decreased hearing of left ear         1. Decreased hearing of left ear  Reassurance given. No wax of significance in right ear, not in the left ear of question  Followup:   No follow-ups on file.

## 2023-06-06 ENCOUNTER — OFFICE VISIT (OUTPATIENT)
Dept: FAMILY MEDICINE CLINIC | Facility: CLINIC | Age: 83
End: 2023-06-06
Payer: MEDICARE

## 2023-06-06 VITALS
DIASTOLIC BLOOD PRESSURE: 70 MMHG | SYSTOLIC BLOOD PRESSURE: 130 MMHG | WEIGHT: 97.4 LBS | HEART RATE: 66 BPM | OXYGEN SATURATION: 96 % | BODY MASS INDEX: 17.67 KG/M2

## 2023-06-06 DIAGNOSIS — F41.9 ANXIETY: Primary | ICD-10-CM

## 2023-06-06 DIAGNOSIS — E16.2 HYPOGLYCEMIA: ICD-10-CM

## 2023-06-06 PROCEDURE — 3078F DIAST BP <80 MM HG: CPT | Performed by: FAMILY MEDICINE

## 2023-06-06 PROCEDURE — 1123F ACP DISCUSS/DSCN MKR DOCD: CPT | Performed by: FAMILY MEDICINE

## 2023-06-06 PROCEDURE — 99213 OFFICE O/P EST LOW 20 MIN: CPT | Performed by: FAMILY MEDICINE

## 2023-06-06 PROCEDURE — 3075F SYST BP GE 130 - 139MM HG: CPT | Performed by: FAMILY MEDICINE

## 2023-06-06 PROCEDURE — G8399 PT W/DXA RESULTS DOCUMENT: HCPCS | Performed by: FAMILY MEDICINE

## 2023-06-06 PROCEDURE — G8419 CALC BMI OUT NRM PARAM NOF/U: HCPCS | Performed by: FAMILY MEDICINE

## 2023-06-06 PROCEDURE — G8427 DOCREV CUR MEDS BY ELIG CLIN: HCPCS | Performed by: FAMILY MEDICINE

## 2023-06-06 PROCEDURE — 1090F PRES/ABSN URINE INCON ASSESS: CPT | Performed by: FAMILY MEDICINE

## 2023-06-06 PROCEDURE — 1036F TOBACCO NON-USER: CPT | Performed by: FAMILY MEDICINE

## 2023-06-06 ASSESSMENT — PATIENT HEALTH QUESTIONNAIRE - PHQ9
10. IF YOU CHECKED OFF ANY PROBLEMS, HOW DIFFICULT HAVE THESE PROBLEMS MADE IT FOR YOU TO DO YOUR WORK, TAKE CARE OF THINGS AT HOME, OR GET ALONG WITH OTHER PEOPLE: 0
4. FEELING TIRED OR HAVING LITTLE ENERGY: 1
8. MOVING OR SPEAKING SO SLOWLY THAT OTHER PEOPLE COULD HAVE NOTICED. OR THE OPPOSITE, BEING SO FIGETY OR RESTLESS THAT YOU HAVE BEEN MOVING AROUND A LOT MORE THAN USUAL: 0
9. THOUGHTS THAT YOU WOULD BE BETTER OFF DEAD, OR OF HURTING YOURSELF: 0
SUM OF ALL RESPONSES TO PHQ QUESTIONS 1-9: 1
1. LITTLE INTEREST OR PLEASURE IN DOING THINGS: 0
SUM OF ALL RESPONSES TO PHQ QUESTIONS 1-9: 1
6. FEELING BAD ABOUT YOURSELF - OR THAT YOU ARE A FAILURE OR HAVE LET YOURSELF OR YOUR FAMILY DOWN: 0
2. FEELING DOWN, DEPRESSED OR HOPELESS: 0
SUM OF ALL RESPONSES TO PHQ9 QUESTIONS 1 & 2: 0
5. POOR APPETITE OR OVEREATING: 0
SUM OF ALL RESPONSES TO PHQ QUESTIONS 1-9: 1
SUM OF ALL RESPONSES TO PHQ QUESTIONS 1-9: 1
3. TROUBLE FALLING OR STAYING ASLEEP: 0
7. TROUBLE CONCENTRATING ON THINGS, SUCH AS READING THE NEWSPAPER OR WATCHING TELEVISION: 0

## 2023-06-06 NOTE — PROGRESS NOTES
Subjective:   Weston Olivares is a 78 y.o. female presents today for discussing her anxiety. The BuSpar is worked very well she wanted to know if she should discontinue that or wean off. She also describes waking up on many mornings feeling anxious, shaky, sometimes sweaty and hungry. When she eats her symptoms seem to resolve. She associates this with her anxiety  PHQ-9 Total Score: 1 (6/6/2023  1:23 PM)  Thoughts that you would be better off dead, or of hurting yourself in some way: 0 (6/6/2023  1:23 PM)        Allergies   Allergen Reactions    Azithromycin Rash    Levofloxacin Anxiety     PMH, MEDS reviewed     Objective:   Blood pressure 130/70, pulse 66, weight 97 lb 6.4 oz (44.2 kg), SpO2 96 %. General- Pleasant and no distress  Psych- alert and oriented to person, place and time  Mood and affect are appropriate to situation  Musculoskeletal - Gait and station examination reveals mid-position with no abnormalities. Neurological- grossly intact. Most of today's visit spent preparing to see the patient including review of any prior or current tests, reviewing separate history, performing the above exam, counseling with review of symptoms, disposition, prognosis, and risk/benefit of treatment plan options in addition to limited behavioral counseling and recommendations, and we have talked about risk factor reduction where appropriate, and patient education, ordering any medications and tests/procedures; of course documenting the record as evident in this note, any appropriate care coordination which may or may not involve written patient education. Assessment/Plan:     1. Anxiety    2. Hypoglycemia         1. Anxiety  2. Hypoglycemia    I explained that given the fact that she failed the Zoloft and beta-blocker, and finally we have something that is helping her anxiety she should certainly stay on the buspar.   It really makes no sense to discontinue or wean off of this medication given the fact

## 2023-06-13 ENCOUNTER — HOSPITAL ENCOUNTER (EMERGENCY)
Age: 83
Discharge: HOME | End: 2023-06-13
Attending: EMERGENCY MEDICINE
Payer: MEDICARE

## 2023-06-13 VITALS
HEIGHT: 62 IN | DIASTOLIC BLOOD PRESSURE: 68 MMHG | OXYGEN SATURATION: 96 % | RESPIRATION RATE: 14 BRPM | SYSTOLIC BLOOD PRESSURE: 139 MMHG | BODY MASS INDEX: 17.85 KG/M2 | HEART RATE: 58 BPM | WEIGHT: 97 LBS | TEMPERATURE: 97.4 F

## 2023-06-13 DIAGNOSIS — E03.9 ACQUIRED HYPOTHYROIDISM: ICD-10-CM

## 2023-06-13 DIAGNOSIS — E03.9 HYPOTHYROIDISM, UNSPECIFIED TYPE: ICD-10-CM

## 2023-06-13 DIAGNOSIS — R63.0 DECREASED APPETITE: ICD-10-CM

## 2023-06-13 DIAGNOSIS — R42 DIZZINESS: Primary | ICD-10-CM

## 2023-06-13 LAB
ANION GAP SERPL CALC-SCNC: 3 MMOL/L (ref 2–11)
BASOPHILS # BLD: 0.1 K/UL (ref 0–0.2)
BASOPHILS NFR BLD: 0 % (ref 0–2)
BUN SERPL-MCNC: 25 MG/DL (ref 8–23)
CALCIUM SERPL-MCNC: 9.2 MG/DL (ref 8.3–10.4)
CHLORIDE SERPL-SCNC: 107 MMOL/L (ref 101–110)
CO2 SERPL-SCNC: 29 MMOL/L (ref 21–32)
CREAT SERPL-MCNC: 1.2 MG/DL (ref 0.6–1)
DIFFERENTIAL METHOD BLD: ABNORMAL
EKG ATRIAL RATE: 63 BPM
EKG DIAGNOSIS: NORMAL
EKG P AXIS: 79 DEGREES
EKG P-R INTERVAL: 164 MS
EKG Q-T INTERVAL: 413 MS
EKG QRS DURATION: 76 MS
EKG QTC CALCULATION (BAZETT): 430 MS
EKG R AXIS: 64 DEGREES
EKG T AXIS: 53 DEGREES
EKG VENTRICULAR RATE: 65 BPM
EOSINOPHIL # BLD: 0.2 K/UL (ref 0–0.8)
EOSINOPHIL NFR BLD: 2 % (ref 0.5–7.8)
ERYTHROCYTE [DISTWIDTH] IN BLOOD BY AUTOMATED COUNT: 12.2 % (ref 11.9–14.6)
GLUCOSE SERPL-MCNC: 95 MG/DL (ref 65–100)
HCT VFR BLD AUTO: 40.9 % (ref 35.8–46.3)
HGB BLD-MCNC: 14 G/DL (ref 11.7–15.4)
IMM GRANULOCYTES # BLD AUTO: 0.1 K/UL (ref 0–0.5)
IMM GRANULOCYTES NFR BLD AUTO: 1 % (ref 0–5)
LYMPHOCYTES # BLD: 1.3 K/UL (ref 0.5–4.6)
LYMPHOCYTES NFR BLD: 11 % (ref 13–44)
MCH RBC QN AUTO: 31.3 PG (ref 26.1–32.9)
MCHC RBC AUTO-ENTMCNC: 34.2 G/DL (ref 31.4–35)
MCV RBC AUTO: 91.5 FL (ref 82–102)
MONOCYTES # BLD: 1.1 K/UL (ref 0.1–1.3)
MONOCYTES NFR BLD: 9 % (ref 4–12)
NEUTS SEG # BLD: 9.6 K/UL (ref 1.7–8.2)
NEUTS SEG NFR BLD: 77 % (ref 43–78)
NRBC # BLD: 0 K/UL (ref 0–0.2)
PLATELET # BLD AUTO: 308 K/UL (ref 150–450)
PMV BLD AUTO: 11.9 FL (ref 9.4–12.3)
POTASSIUM SERPL-SCNC: 4 MMOL/L (ref 3.5–5.1)
RBC # BLD AUTO: 4.47 M/UL (ref 4.05–5.2)
SODIUM SERPL-SCNC: 139 MMOL/L (ref 133–143)
TSH, 3RD GENERATION: 6.34 UIU/ML (ref 0.36–3.74)
WBC # BLD AUTO: 12.4 K/UL (ref 4.3–11.1)

## 2023-06-13 PROCEDURE — 93005 ELECTROCARDIOGRAM TRACING: CPT | Performed by: EMERGENCY MEDICINE

## 2023-06-13 PROCEDURE — 84443 ASSAY THYROID STIM HORMONE: CPT

## 2023-06-13 PROCEDURE — 80048 BASIC METABOLIC PNL TOTAL CA: CPT

## 2023-06-13 PROCEDURE — 99284 EMERGENCY DEPT VISIT MOD MDM: CPT

## 2023-06-13 PROCEDURE — 93010 ELECTROCARDIOGRAM REPORT: CPT | Performed by: INTERNAL MEDICINE

## 2023-06-13 PROCEDURE — 85025 COMPLETE CBC W/AUTO DIFF WBC: CPT

## 2023-06-13 RX ORDER — MIRTAZAPINE 7.5 MG/1
7.5 TABLET, FILM COATED ORAL NIGHTLY
Qty: 14 TABLET | Refills: 1 | Status: SHIPPED | OUTPATIENT
Start: 2023-06-13 | End: 2023-06-14 | Stop reason: SINTOL

## 2023-06-13 RX ORDER — LEVOTHYROXINE SODIUM 0.07 MG/1
75 TABLET ORAL
Qty: 90 TABLET | Refills: 3 | Status: SHIPPED | OUTPATIENT
Start: 2023-06-13 | End: 2023-06-14

## 2023-06-13 ASSESSMENT — ENCOUNTER SYMPTOMS
FACIAL SWELLING: 0
EYE DISCHARGE: 0
COLOR CHANGE: 0
VOMITING: 0
SHORTNESS OF BREATH: 0
COUGH: 0
ABDOMINAL PAIN: 0
NAUSEA: 0
RHINORRHEA: 0
BACK PAIN: 0
EYE REDNESS: 0

## 2023-06-13 ASSESSMENT — PAIN - FUNCTIONAL ASSESSMENT: PAIN_FUNCTIONAL_ASSESSMENT: 0-10

## 2023-06-13 ASSESSMENT — PAIN SCALES - GENERAL: PAINLEVEL_OUTOF10: 0

## 2023-06-13 NOTE — ED PROVIDER NOTES
Emergency Department Provider Note       PCP: Guanako Todd MD   Age: 78 y.o. Sex: female     DISPOSITION       No diagnosis found. Medical Decision Making     Complexity of Problems Addressed:  1 or more acute illnesses that pose a threat to life or bodily function. Data Reviewed and Analyzed:  Category 1:   I independently ordered and reviewed each unique test.  I reviewed external records: ED visit note from an outside group. I reviewed external records: provider visit note from PCP. I reviewed external records: previous EKG including cardiologist interpretation. I reviewed external records: previous lab results from outside ED. Category 2:   I independently ordered and interpreted the ED EKG in the absence of a Cardiologist.    Rate: 65  EKG Interpretation: EKG Interpretation: sinus rhythm, no evidence of arrhythmia and non-specific EKG  ST Segments: Normal ST segments - NO STEMI  I interpreted the blood work from the laboratory. Category 3: Discussion of management or test interpretation. 25-year-old female with dizzy spell potentially more likely anxiety. Differential diagnosis does include hypoglycemia but she was neither tremulous or diaphoretic. EKG looks good, will check some screening blood work and anticipate discharging home. We will start her on Remeron for her appetite    Risk of Complications and/or Morbidity of Patient Management:  Prescription drug management performed. Patient was discharged risks and benefits of hospitalization were considered. Shared medical decision making was utilized in creating the patients health plan today. Considerations: The following items were considered but not ordered: Cranial computed tomography. Is this patient to be included in the SEP-1 core measure due to severe sepsis or septic shock? No Exclusion criteria - the patient is NOT to be included for SEP-1 Core Measure due to:  Infection is not suspected      History

## 2023-06-13 NOTE — ED NOTES
I have reviewed discharge instructions with the patient. The patient verbalized understanding. Patient left ED via Discharge Method: ambulatory to Home   Opportunity for questions and clarification provided. Patient given 2 scripts. To continue your aftercare when you leave the hospital, you may receive an automated call from our care team to check in on how you are doing. This is a free service and part of our promise to provide the best care and service to meet your aftercare needs.  If you have questions, or wish to unsubscribe from this service please call [phone]. Thank you for Choosing our [facility] Emergency Department.        Georgia Rojas RN  06/13/23 7039

## 2023-06-13 NOTE — ED TRIAGE NOTES
Patient to ER via EMS with c/o of dizziness at home last night thinks it's her BGL it was 106 with EMS, sitting BP was 135/72 pulse was 65, standing BP was 154/74 pulse 71. Pt states shes not diabetic.

## 2023-08-01 ENCOUNTER — OFFICE VISIT (OUTPATIENT)
Dept: FAMILY MEDICINE CLINIC | Facility: CLINIC | Age: 83
End: 2023-08-01
Payer: MEDICARE

## 2023-08-01 VITALS
TEMPERATURE: 97 F | OXYGEN SATURATION: 98 % | DIASTOLIC BLOOD PRESSURE: 60 MMHG | HEIGHT: 62 IN | HEART RATE: 70 BPM | WEIGHT: 99 LBS | SYSTOLIC BLOOD PRESSURE: 108 MMHG | BODY MASS INDEX: 18.22 KG/M2

## 2023-08-01 DIAGNOSIS — H10.9 CONJUNCTIVITIS OF LEFT EYE, UNSPECIFIED CONJUNCTIVITIS TYPE: Primary | ICD-10-CM

## 2023-08-01 PROCEDURE — 1123F ACP DISCUSS/DSCN MKR DOCD: CPT | Performed by: FAMILY MEDICINE

## 2023-08-01 PROCEDURE — G8399 PT W/DXA RESULTS DOCUMENT: HCPCS | Performed by: FAMILY MEDICINE

## 2023-08-01 PROCEDURE — 3074F SYST BP LT 130 MM HG: CPT | Performed by: FAMILY MEDICINE

## 2023-08-01 PROCEDURE — 1090F PRES/ABSN URINE INCON ASSESS: CPT | Performed by: FAMILY MEDICINE

## 2023-08-01 PROCEDURE — 99213 OFFICE O/P EST LOW 20 MIN: CPT | Performed by: FAMILY MEDICINE

## 2023-08-01 PROCEDURE — G8419 CALC BMI OUT NRM PARAM NOF/U: HCPCS | Performed by: FAMILY MEDICINE

## 2023-08-01 PROCEDURE — 3078F DIAST BP <80 MM HG: CPT | Performed by: FAMILY MEDICINE

## 2023-08-01 PROCEDURE — 1036F TOBACCO NON-USER: CPT | Performed by: FAMILY MEDICINE

## 2023-08-01 PROCEDURE — G8427 DOCREV CUR MEDS BY ELIG CLIN: HCPCS | Performed by: FAMILY MEDICINE

## 2023-08-01 RX ORDER — CIPROFLOXACIN HYDROCHLORIDE 3.5 MG/ML
1 SOLUTION/ DROPS TOPICAL
Qty: 2.5 ML | Refills: 1 | Status: SHIPPED | OUTPATIENT
Start: 2023-08-01 | End: 2023-08-11

## 2023-08-01 ASSESSMENT — PATIENT HEALTH QUESTIONNAIRE - PHQ9
6. FEELING BAD ABOUT YOURSELF - OR THAT YOU ARE A FAILURE OR HAVE LET YOURSELF OR YOUR FAMILY DOWN: 0
7. TROUBLE CONCENTRATING ON THINGS, SUCH AS READING THE NEWSPAPER OR WATCHING TELEVISION: 0
10. IF YOU CHECKED OFF ANY PROBLEMS, HOW DIFFICULT HAVE THESE PROBLEMS MADE IT FOR YOU TO DO YOUR WORK, TAKE CARE OF THINGS AT HOME, OR GET ALONG WITH OTHER PEOPLE: 0
SUM OF ALL RESPONSES TO PHQ9 QUESTIONS 1 & 2: 0
3. TROUBLE FALLING OR STAYING ASLEEP: 0
SUM OF ALL RESPONSES TO PHQ QUESTIONS 1-9: 0
9. THOUGHTS THAT YOU WOULD BE BETTER OFF DEAD, OR OF HURTING YOURSELF: 0
1. LITTLE INTEREST OR PLEASURE IN DOING THINGS: 0
2. FEELING DOWN, DEPRESSED OR HOPELESS: 0
4. FEELING TIRED OR HAVING LITTLE ENERGY: 0
5. POOR APPETITE OR OVEREATING: 0
8. MOVING OR SPEAKING SO SLOWLY THAT OTHER PEOPLE COULD HAVE NOTICED. OR THE OPPOSITE, BEING SO FIGETY OR RESTLESS THAT YOU HAVE BEEN MOVING AROUND A LOT MORE THAN USUAL: 0
SUM OF ALL RESPONSES TO PHQ QUESTIONS 1-9: 0

## 2023-08-04 ENCOUNTER — TELEPHONE (OUTPATIENT)
Dept: FAMILY MEDICINE CLINIC | Facility: CLINIC | Age: 83
End: 2023-08-04

## 2023-08-04 NOTE — TELEPHONE ENCOUNTER
Pt says she has been using the eye drops, and when she applies it, it works ok for a little while but then her eye will start to hurt again. She is unsure what to do, or if she should keep using it.

## 2023-08-15 ENCOUNTER — OFFICE VISIT (OUTPATIENT)
Dept: FAMILY MEDICINE CLINIC | Facility: CLINIC | Age: 83
End: 2023-08-15
Payer: MEDICARE

## 2023-08-15 VITALS
WEIGHT: 100.9 LBS | RESPIRATION RATE: 17 BRPM | OXYGEN SATURATION: 97 % | DIASTOLIC BLOOD PRESSURE: 66 MMHG | HEIGHT: 64 IN | TEMPERATURE: 97.5 F | SYSTOLIC BLOOD PRESSURE: 147 MMHG | HEART RATE: 56 BPM | BODY MASS INDEX: 17.23 KG/M2

## 2023-08-15 DIAGNOSIS — F32.1 MODERATE SINGLE CURRENT EPISODE OF MAJOR DEPRESSIVE DISORDER (HCC): ICD-10-CM

## 2023-08-15 DIAGNOSIS — I10 ESSENTIAL HYPERTENSION: Primary | ICD-10-CM

## 2023-08-15 DIAGNOSIS — E03.9 ACQUIRED HYPOTHYROIDISM: ICD-10-CM

## 2023-08-15 DIAGNOSIS — F41.9 ANXIETY: ICD-10-CM

## 2023-08-15 PROCEDURE — 1123F ACP DISCUSS/DSCN MKR DOCD: CPT | Performed by: FAMILY MEDICINE

## 2023-08-15 PROCEDURE — 3077F SYST BP >= 140 MM HG: CPT | Performed by: FAMILY MEDICINE

## 2023-08-15 PROCEDURE — 1090F PRES/ABSN URINE INCON ASSESS: CPT | Performed by: FAMILY MEDICINE

## 2023-08-15 PROCEDURE — G8419 CALC BMI OUT NRM PARAM NOF/U: HCPCS | Performed by: FAMILY MEDICINE

## 2023-08-15 PROCEDURE — G8427 DOCREV CUR MEDS BY ELIG CLIN: HCPCS | Performed by: FAMILY MEDICINE

## 2023-08-15 PROCEDURE — 1036F TOBACCO NON-USER: CPT | Performed by: FAMILY MEDICINE

## 2023-08-15 PROCEDURE — 99214 OFFICE O/P EST MOD 30 MIN: CPT | Performed by: FAMILY MEDICINE

## 2023-08-15 PROCEDURE — G8399 PT W/DXA RESULTS DOCUMENT: HCPCS | Performed by: FAMILY MEDICINE

## 2023-08-15 PROCEDURE — 3078F DIAST BP <80 MM HG: CPT | Performed by: FAMILY MEDICINE

## 2023-08-15 RX ORDER — LEVOTHYROXINE SODIUM 0.05 MG/1
50 TABLET ORAL
Qty: 90 TABLET | Refills: 1 | Status: SHIPPED | OUTPATIENT
Start: 2023-08-15

## 2023-08-15 RX ORDER — BUSPIRONE HYDROCHLORIDE 15 MG/1
15 TABLET ORAL 3 TIMES DAILY
Qty: 270 TABLET | Refills: 1 | Status: SHIPPED | OUTPATIENT
Start: 2023-08-15

## 2023-08-15 RX ORDER — METOPROLOL SUCCINATE 50 MG/1
50 TABLET, EXTENDED RELEASE ORAL DAILY
Qty: 90 TABLET | Refills: 1 | Status: SHIPPED | OUTPATIENT
Start: 2023-08-15

## 2023-08-15 RX ORDER — LOSARTAN POTASSIUM 50 MG/1
50 TABLET ORAL DAILY
Qty: 90 TABLET | Refills: 1 | Status: SHIPPED | OUTPATIENT
Start: 2023-08-15

## 2023-08-15 ASSESSMENT — PATIENT HEALTH QUESTIONNAIRE - PHQ9
SUM OF ALL RESPONSES TO PHQ QUESTIONS 1-9: 0
SUM OF ALL RESPONSES TO PHQ9 QUESTIONS 1 & 2: 0
SUM OF ALL RESPONSES TO PHQ QUESTIONS 1-9: 0
1. LITTLE INTEREST OR PLEASURE IN DOING THINGS: 0
SUM OF ALL RESPONSES TO PHQ QUESTIONS 1-9: 0
SUM OF ALL RESPONSES TO PHQ QUESTIONS 1-9: 0
2. FEELING DOWN, DEPRESSED OR HOPELESS: 0

## 2023-08-15 NOTE — PROGRESS NOTES
130-139/80-89. We sometimes begin treatment with medications but prefer a trial of 3-6 months with diet, exercise, smoking cessation etc.  Stage 2 is >140/90 which is when we always begin treatment with medications and our goal is to get bp at or below 130/80      1. Essential hypertension  -     metoprolol succinate (TOPROL XL) 50 MG extended release tablet; Take 1 tablet by mouth daily, Disp-90 tablet, R-1Normal  -     losartan (COZAAR) 50 MG tablet; Take 1 tablet by mouth daily, Disp-90 tablet, R-1Normal  -     Basic Metabolic Panel; Future  2. Moderate single current episode of major depressive disorder ([address])  3. Acquired hypothyroidism  -     levothyroxine (SYNTHROID) 50 MCG tablet; Take 1 tablet by mouth every morning (before breakfast), Disp-90 tablet, R-1Normal  -     TSH; Future  4. Anxiety  -     busPIRone (BUSPAR) 15 MG tablet; Take 15 mg by mouth 3 times daily, Disp-270 tablet, R-1Normal      Followup:  Return for 6 mo for bp recheck. Late Oct or early Nov for lavinia.

## 2023-08-16 LAB
ANION GAP SERPL CALC-SCNC: 5 MMOL/L (ref 2–11)
BUN SERPL-MCNC: 24 MG/DL (ref 8–23)
CALCIUM SERPL-MCNC: 9.5 MG/DL (ref 8.3–10.4)
CHLORIDE SERPL-SCNC: 109 MMOL/L (ref 101–110)
CO2 SERPL-SCNC: 27 MMOL/L (ref 21–32)
CREAT SERPL-MCNC: 1.2 MG/DL (ref 0.6–1)
GLUCOSE SERPL-MCNC: 88 MG/DL (ref 65–100)
POTASSIUM SERPL-SCNC: 4.5 MMOL/L (ref 3.5–5.1)
SODIUM SERPL-SCNC: 141 MMOL/L (ref 133–143)
TSH, 3RD GENERATION: 3.82 UIU/ML (ref 0.36–3.74)

## 2023-10-16 ENCOUNTER — TELEPHONE (OUTPATIENT)
Dept: FAMILY MEDICINE CLINIC | Facility: CLINIC | Age: 83
End: 2023-10-16

## 2023-10-16 DIAGNOSIS — I10 ESSENTIAL HYPERTENSION: ICD-10-CM

## 2023-10-16 RX ORDER — LOSARTAN POTASSIUM 50 MG/1
50 TABLET ORAL DAILY
Qty: 90 TABLET | Refills: 1 | Status: CANCELLED | OUTPATIENT
Start: 2023-10-16

## 2023-10-16 RX ORDER — METOPROLOL SUCCINATE 50 MG/1
50 TABLET, EXTENDED RELEASE ORAL DAILY
Qty: 90 TABLET | Refills: 1 | Status: CANCELLED | OUTPATIENT
Start: 2023-10-16

## 2023-10-16 NOTE — TELEPHONE ENCOUNTER
Patient was called to notify of enclosed of Dr. Gautam Beasley message Rx was last filled in August with 90 day supply with 1 refill. If any further concerns to follow up with office.

## 2023-10-16 NOTE — TELEPHONE ENCOUNTER
Patient called requesting the following refills:    Medications: Cozaar 50mg, Toprol XL 50mg     Pharmacy: [facility], [place]    Thank you.

## 2023-10-30 ENCOUNTER — OFFICE VISIT (OUTPATIENT)
Dept: FAMILY MEDICINE CLINIC | Facility: CLINIC | Age: 83
End: 2023-10-30
Payer: MEDICARE

## 2023-10-30 VITALS
DIASTOLIC BLOOD PRESSURE: 67 MMHG | OXYGEN SATURATION: 99 % | TEMPERATURE: 97.2 F | WEIGHT: 102 LBS | HEIGHT: 62 IN | RESPIRATION RATE: 16 BRPM | HEART RATE: 54 BPM | BODY MASS INDEX: 18.77 KG/M2 | SYSTOLIC BLOOD PRESSURE: 145 MMHG

## 2023-10-30 DIAGNOSIS — Z00.00 MEDICARE ANNUAL WELLNESS VISIT, SUBSEQUENT: Chronic | ICD-10-CM

## 2023-10-30 PROCEDURE — G8484 FLU IMMUNIZE NO ADMIN: HCPCS | Performed by: FAMILY MEDICINE

## 2023-10-30 PROCEDURE — 3078F DIAST BP <80 MM HG: CPT | Performed by: FAMILY MEDICINE

## 2023-10-30 PROCEDURE — 1123F ACP DISCUSS/DSCN MKR DOCD: CPT | Performed by: FAMILY MEDICINE

## 2023-10-30 PROCEDURE — G0439 PPPS, SUBSEQ VISIT: HCPCS | Performed by: FAMILY MEDICINE

## 2023-10-30 PROCEDURE — 3077F SYST BP >= 140 MM HG: CPT | Performed by: FAMILY MEDICINE

## 2023-10-30 ASSESSMENT — COLUMBIA-SUICIDE SEVERITY RATING SCALE - C-SSRS
7. DID THIS OCCUR IN THE LAST THREE MONTHS: NO
5. HAVE YOU STARTED TO WORK OUT OR WORKED OUT THE DETAILS OF HOW TO KILL YOURSELF? DO YOU INTEND TO CARRY OUT THIS PLAN?: NO
3. HAVE YOU BEEN THINKING ABOUT HOW YOU MIGHT KILL YOURSELF?: NO
4. HAVE YOU HAD THESE THOUGHTS AND HAD SOME INTENTION OF ACTING ON THEM?: NO

## 2023-10-30 ASSESSMENT — PATIENT HEALTH QUESTIONNAIRE - PHQ9
SUM OF ALL RESPONSES TO PHQ9 QUESTIONS 1 & 2: 0
8. MOVING OR SPEAKING SO SLOWLY THAT OTHER PEOPLE COULD HAVE NOTICED. OR THE OPPOSITE, BEING SO FIGETY OR RESTLESS THAT YOU HAVE BEEN MOVING AROUND A LOT MORE THAN USUAL: 0
6. FEELING BAD ABOUT YOURSELF - OR THAT YOU ARE A FAILURE OR HAVE LET YOURSELF OR YOUR FAMILY DOWN: 0
9. THOUGHTS THAT YOU WOULD BE BETTER OFF DEAD, OR OF HURTING YOURSELF: 0
SUM OF ALL RESPONSES TO PHQ QUESTIONS 1-9: 0
3. TROUBLE FALLING OR STAYING ASLEEP: 0
4. FEELING TIRED OR HAVING LITTLE ENERGY: 0
5. POOR APPETITE OR OVEREATING: 0
1. LITTLE INTEREST OR PLEASURE IN DOING THINGS: 0
10. IF YOU CHECKED OFF ANY PROBLEMS, HOW DIFFICULT HAVE THESE PROBLEMS MADE IT FOR YOU TO DO YOUR WORK, TAKE CARE OF THINGS AT HOME, OR GET ALONG WITH OTHER PEOPLE: 0
SUM OF ALL RESPONSES TO PHQ QUESTIONS 1-9: 0
2. FEELING DOWN, DEPRESSED OR HOPELESS: 0
7. TROUBLE CONCENTRATING ON THINGS, SUCH AS READING THE NEWSPAPER OR WATCHING TELEVISION: 0
SUM OF ALL RESPONSES TO PHQ QUESTIONS 1-9: 0
SUM OF ALL RESPONSES TO PHQ QUESTIONS 1-9: 0

## 2023-10-30 ASSESSMENT — LIFESTYLE VARIABLES
HOW OFTEN DO YOU HAVE A DRINK CONTAINING ALCOHOL: NEVER
HOW MANY STANDARD DRINKS CONTAINING ALCOHOL DO YOU HAVE ON A TYPICAL DAY: PATIENT DOES NOT DRINK

## 2023-11-02 ENCOUNTER — OFFICE VISIT (OUTPATIENT)
Dept: FAMILY MEDICINE CLINIC | Facility: CLINIC | Age: 83
End: 2023-11-02

## 2023-11-02 VITALS
DIASTOLIC BLOOD PRESSURE: 67 MMHG | SYSTOLIC BLOOD PRESSURE: 128 MMHG | HEART RATE: 73 BPM | BODY MASS INDEX: 18.84 KG/M2 | TEMPERATURE: 98.3 F | WEIGHT: 103 LBS

## 2023-11-02 DIAGNOSIS — J06.9 VIRAL URI: Primary | ICD-10-CM

## 2023-11-02 DIAGNOSIS — J02.9 ACUTE VIRAL PHARYNGITIS: ICD-10-CM

## 2023-11-02 ASSESSMENT — PATIENT HEALTH QUESTIONNAIRE - PHQ9
5. POOR APPETITE OR OVEREATING: 0
3. TROUBLE FALLING OR STAYING ASLEEP: 0
SUM OF ALL RESPONSES TO PHQ QUESTIONS 1-9: 0
SUM OF ALL RESPONSES TO PHQ9 QUESTIONS 1 & 2: 0
SUM OF ALL RESPONSES TO PHQ QUESTIONS 1-9: 0
6. FEELING BAD ABOUT YOURSELF - OR THAT YOU ARE A FAILURE OR HAVE LET YOURSELF OR YOUR FAMILY DOWN: 0
9. THOUGHTS THAT YOU WOULD BE BETTER OFF DEAD, OR OF HURTING YOURSELF: 0
1. LITTLE INTEREST OR PLEASURE IN DOING THINGS: 0
7. TROUBLE CONCENTRATING ON THINGS, SUCH AS READING THE NEWSPAPER OR WATCHING TELEVISION: 0
SUM OF ALL RESPONSES TO PHQ QUESTIONS 1-9: 0
SUM OF ALL RESPONSES TO PHQ QUESTIONS 1-9: 0
4. FEELING TIRED OR HAVING LITTLE ENERGY: 0
8. MOVING OR SPEAKING SO SLOWLY THAT OTHER PEOPLE COULD HAVE NOTICED. OR THE OPPOSITE, BEING SO FIGETY OR RESTLESS THAT YOU HAVE BEEN MOVING AROUND A LOT MORE THAN USUAL: 0
2. FEELING DOWN, DEPRESSED OR HOPELESS: 0
10. IF YOU CHECKED OFF ANY PROBLEMS, HOW DIFFICULT HAVE THESE PROBLEMS MADE IT FOR YOU TO DO YOUR WORK, TAKE CARE OF THINGS AT HOME, OR GET ALONG WITH OTHER PEOPLE: 0

## 2024-02-20 DIAGNOSIS — E03.9 ACQUIRED HYPOTHYROIDISM: ICD-10-CM

## 2024-02-20 RX ORDER — LEVOTHYROXINE SODIUM 0.05 MG/1
50 TABLET ORAL
Qty: 90 TABLET | Refills: 1 | Status: SHIPPED | OUTPATIENT
Start: 2024-02-20 | End: 2024-02-23 | Stop reason: SDUPTHER

## 2024-02-23 ENCOUNTER — TELEPHONE (OUTPATIENT)
Dept: FAMILY MEDICINE CLINIC | Facility: CLINIC | Age: 84
End: 2024-02-23

## 2024-02-23 ENCOUNTER — OFFICE VISIT (OUTPATIENT)
Dept: FAMILY MEDICINE CLINIC | Facility: CLINIC | Age: 84
End: 2024-02-23

## 2024-02-23 VITALS
WEIGHT: 104 LBS | OXYGEN SATURATION: 96 % | DIASTOLIC BLOOD PRESSURE: 68 MMHG | SYSTOLIC BLOOD PRESSURE: 146 MMHG | RESPIRATION RATE: 16 BRPM | BODY MASS INDEX: 17.75 KG/M2 | HEART RATE: 58 BPM | HEIGHT: 64 IN | TEMPERATURE: 97.1 F

## 2024-02-23 DIAGNOSIS — I10 ESSENTIAL HYPERTENSION: Primary | ICD-10-CM

## 2024-02-23 DIAGNOSIS — E03.9 ACQUIRED HYPOTHYROIDISM: ICD-10-CM

## 2024-02-23 DIAGNOSIS — R49.0 HOARSENESS OF VOICE: ICD-10-CM

## 2024-02-23 DIAGNOSIS — N18.30 STAGE 3 CHRONIC KIDNEY DISEASE, UNSPECIFIED WHETHER STAGE 3A OR 3B CKD (HCC): ICD-10-CM

## 2024-02-23 DIAGNOSIS — F41.9 ANXIETY: ICD-10-CM

## 2024-02-23 DIAGNOSIS — J47.1 BRONCHIECTASIS WITH (ACUTE) EXACERBATION (HCC): ICD-10-CM

## 2024-02-23 DIAGNOSIS — F32.1 MODERATE SINGLE CURRENT EPISODE OF MAJOR DEPRESSIVE DISORDER (HCC): ICD-10-CM

## 2024-02-23 LAB
ANION GAP SERPL CALC-SCNC: 5 MMOL/L (ref 2–11)
BUN SERPL-MCNC: 23 MG/DL (ref 8–23)
CALCIUM SERPL-MCNC: 9.8 MG/DL (ref 8.3–10.4)
CHLORIDE SERPL-SCNC: 107 MMOL/L (ref 103–113)
CO2 SERPL-SCNC: 29 MMOL/L (ref 21–32)
CREAT SERPL-MCNC: 1.3 MG/DL (ref 0.6–1)
GLUCOSE SERPL-MCNC: 79 MG/DL (ref 65–100)
POTASSIUM SERPL-SCNC: 4.6 MMOL/L (ref 3.5–5.1)
SODIUM SERPL-SCNC: 141 MMOL/L (ref 136–146)

## 2024-02-23 RX ORDER — LOSARTAN POTASSIUM 50 MG/1
50 TABLET ORAL DAILY
Qty: 90 TABLET | Refills: 1 | Status: SHIPPED | OUTPATIENT
Start: 2024-02-23

## 2024-02-23 RX ORDER — LEVOTHYROXINE SODIUM 0.05 MG/1
50 TABLET ORAL
Qty: 90 TABLET | Refills: 1 | Status: SHIPPED | OUTPATIENT
Start: 2024-02-23

## 2024-02-23 RX ORDER — METOPROLOL SUCCINATE 50 MG/1
50 TABLET, EXTENDED RELEASE ORAL DAILY
Qty: 90 TABLET | Refills: 1 | Status: SHIPPED | OUTPATIENT
Start: 2024-02-23

## 2024-02-23 RX ORDER — BUSPIRONE HYDROCHLORIDE 15 MG/1
15 TABLET ORAL 3 TIMES DAILY
Qty: 270 TABLET | Refills: 1 | Status: SHIPPED | OUTPATIENT
Start: 2024-02-23

## 2024-02-23 SDOH — ECONOMIC STABILITY: INCOME INSECURITY: HOW HARD IS IT FOR YOU TO PAY FOR THE VERY BASICS LIKE FOOD, HOUSING, MEDICAL CARE, AND HEATING?: NOT VERY HARD

## 2024-02-23 SDOH — ECONOMIC STABILITY: FOOD INSECURITY: WITHIN THE PAST 12 MONTHS, YOU WORRIED THAT YOUR FOOD WOULD RUN OUT BEFORE YOU GOT MONEY TO BUY MORE.: NEVER TRUE

## 2024-02-23 SDOH — ECONOMIC STABILITY: FOOD INSECURITY: WITHIN THE PAST 12 MONTHS, THE FOOD YOU BOUGHT JUST DIDN'T LAST AND YOU DIDN'T HAVE MONEY TO GET MORE.: NEVER TRUE

## 2024-02-23 ASSESSMENT — PATIENT HEALTH QUESTIONNAIRE - PHQ9
2. FEELING DOWN, DEPRESSED OR HOPELESS: 0
8. MOVING OR SPEAKING SO SLOWLY THAT OTHER PEOPLE COULD HAVE NOTICED. OR THE OPPOSITE, BEING SO FIGETY OR RESTLESS THAT YOU HAVE BEEN MOVING AROUND A LOT MORE THAN USUAL: 0
SUM OF ALL RESPONSES TO PHQ QUESTIONS 1-9: 0
7. TROUBLE CONCENTRATING ON THINGS, SUCH AS READING THE NEWSPAPER OR WATCHING TELEVISION: 0
9. THOUGHTS THAT YOU WOULD BE BETTER OFF DEAD, OR OF HURTING YOURSELF: 0
SUM OF ALL RESPONSES TO PHQ9 QUESTIONS 1 & 2: 0
6. FEELING BAD ABOUT YOURSELF - OR THAT YOU ARE A FAILURE OR HAVE LET YOURSELF OR YOUR FAMILY DOWN: 0
4. FEELING TIRED OR HAVING LITTLE ENERGY: 0
1. LITTLE INTEREST OR PLEASURE IN DOING THINGS: 0
SUM OF ALL RESPONSES TO PHQ QUESTIONS 1-9: 0
3. TROUBLE FALLING OR STAYING ASLEEP: 0
SUM OF ALL RESPONSES TO PHQ QUESTIONS 1-9: 0
10. IF YOU CHECKED OFF ANY PROBLEMS, HOW DIFFICULT HAVE THESE PROBLEMS MADE IT FOR YOU TO DO YOUR WORK, TAKE CARE OF THINGS AT HOME, OR GET ALONG WITH OTHER PEOPLE: 0
5. POOR APPETITE OR OVEREATING: 0
SUM OF ALL RESPONSES TO PHQ QUESTIONS 1-9: 0

## 2024-02-23 NOTE — PROGRESS NOTES
bookstore or go to [email or web address] and print DASH diet) will lower 8-14mm blood pressure.  Salt restriction will lower your bp 2-8mm.  Lowering alcohol consumption to moderate use will lower your pressure 2-4mm. Continue efforts to maintain an exercise regimen.    Normal blood pressure target is now 120/80.   Stage 1 hypertension is 130-139/80-89. We sometimes begin treatment with medications but prefer a trial of 3-6 months with diet, exercise, smoking cessation etc.  Stage 2 is >140/90 which is when we always begin treatment with medications and our goal is to get bp at or below 130/80      1. Essential hypertension  -     losartan (COZAAR) 50 MG tablet; Take 1 tablet by mouth daily, Disp-90 tablet, R-1Normal  -     metoprolol succinate (TOPROL XL) 50 MG extended release tablet; Take 1 tablet by mouth daily, Disp-90 tablet, R-1Normal  -     Basic Metabolic Panel; Future  2. Anxiety  -     busPIRone (BUSPAR) 15 MG tablet; Take 15 mg by mouth 3 times daily, Disp-270 tablet, R-1Normal  3. Acquired hypothyroidism  -     levothyroxine (SYNTHROID) 50 MCG tablet; Take 1 tablet by mouth every morning (before breakfast), Disp-90 tablet, R-1Normal  4. Hoarseness of voice  -     Ambulatory referral to ENT  5. Moderate single current episode of major depressive disorder (HCC)  Assessment & Plan:   Borderline controlled, continue current medications and continue current treatment plan  6. Bronchiectasis with (acute) exacerbation (HCC)  Assessment & Plan:   Asymptomatic, continue current medications and continue current treatment plan  7. Stage 3 chronic kidney disease, unspecified whether stage 3a or 3b CKD (HCC)      Followup:  Return for 6 mo for bp recheck and tsh.

## 2024-02-26 NOTE — TELEPHONE ENCOUNTER
Nothing to worry about.  That is essentially normal for people her age.  The kidneys are not what they used to be.  We try to avoid Aleve, Advil, ibuprofen etc. on a daily basis.  Instead use Tylenol.  Also important to stay hydrated with water.  Everything else we are already doing to help that

## 2024-03-06 ENCOUNTER — OFFICE VISIT (OUTPATIENT)
Dept: ENT CLINIC | Age: 84
End: 2024-03-06
Payer: MEDICARE

## 2024-03-06 VITALS
HEIGHT: 64 IN | WEIGHT: 104.2 LBS | DIASTOLIC BLOOD PRESSURE: 72 MMHG | SYSTOLIC BLOOD PRESSURE: 128 MMHG | BODY MASS INDEX: 17.79 KG/M2

## 2024-03-06 DIAGNOSIS — R49.0 HOARSENESS: Primary | ICD-10-CM

## 2024-03-06 DIAGNOSIS — R49.0 MUSCLE TENSION DYSPHONIA: ICD-10-CM

## 2024-03-06 DIAGNOSIS — J38.7 PRESBYLARYNX: ICD-10-CM

## 2024-03-06 PROCEDURE — 3078F DIAST BP <80 MM HG: CPT | Performed by: STUDENT IN AN ORGANIZED HEALTH CARE EDUCATION/TRAINING PROGRAM

## 2024-03-06 PROCEDURE — 1036F TOBACCO NON-USER: CPT | Performed by: STUDENT IN AN ORGANIZED HEALTH CARE EDUCATION/TRAINING PROGRAM

## 2024-03-06 PROCEDURE — 1123F ACP DISCUSS/DSCN MKR DOCD: CPT | Performed by: STUDENT IN AN ORGANIZED HEALTH CARE EDUCATION/TRAINING PROGRAM

## 2024-03-06 PROCEDURE — 3074F SYST BP LT 130 MM HG: CPT | Performed by: STUDENT IN AN ORGANIZED HEALTH CARE EDUCATION/TRAINING PROGRAM

## 2024-03-06 PROCEDURE — 31575 DIAGNOSTIC LARYNGOSCOPY: CPT | Performed by: STUDENT IN AN ORGANIZED HEALTH CARE EDUCATION/TRAINING PROGRAM

## 2024-03-06 PROCEDURE — 1090F PRES/ABSN URINE INCON ASSESS: CPT | Performed by: STUDENT IN AN ORGANIZED HEALTH CARE EDUCATION/TRAINING PROGRAM

## 2024-03-06 PROCEDURE — G8419 CALC BMI OUT NRM PARAM NOF/U: HCPCS | Performed by: STUDENT IN AN ORGANIZED HEALTH CARE EDUCATION/TRAINING PROGRAM

## 2024-03-06 PROCEDURE — G8399 PT W/DXA RESULTS DOCUMENT: HCPCS | Performed by: STUDENT IN AN ORGANIZED HEALTH CARE EDUCATION/TRAINING PROGRAM

## 2024-03-06 PROCEDURE — 99204 OFFICE O/P NEW MOD 45 MIN: CPT | Performed by: STUDENT IN AN ORGANIZED HEALTH CARE EDUCATION/TRAINING PROGRAM

## 2024-03-06 PROCEDURE — G8427 DOCREV CUR MEDS BY ELIG CLIN: HCPCS | Performed by: STUDENT IN AN ORGANIZED HEALTH CARE EDUCATION/TRAINING PROGRAM

## 2024-03-06 PROCEDURE — G8484 FLU IMMUNIZE NO ADMIN: HCPCS | Performed by: STUDENT IN AN ORGANIZED HEALTH CARE EDUCATION/TRAINING PROGRAM

## 2024-03-06 ASSESSMENT — ENCOUNTER SYMPTOMS
VOICE CHANGE: 1
VOMITING: 0
EYE ITCHING: 0
SHORTNESS OF BREATH: 0
EYE REDNESS: 0

## 2024-03-06 NOTE — PROGRESS NOTES
stored on PACS.    EXAM QUALITY:  Overall exam quality is  excellent.Pulmonary arterial enhancement is optimal,the breath hold is optimal,and there  are nosignficant artifacts impacting image quality.        CTPA FINDINGS:      PULMONARY ARTERIES: Normal.    RIGHT VENTRICULAR DILATATION: None.    HEART and AORTA: The heart size is within normal limits.  No pericardial effusion.  The thoracic aorta is normal in caliber, without evidence of dissection.    HILUM AND MEDIASTINUM:: There is persistent mild narrowing of the proximal right mainstem bronchus.  Otherwise, the trachea and proximal bronchi are patent.  There has been interval development of multiple enlarged mediastinal lymph node.  For example a pretracheal node measures 1.1 cm in the short axis (axial image 100).  A subcarinal node measures 1.2 cm in the short axis (axial image 84).    PULMONARY PARENCHYMA and PLEURA: There is persistent bilateral central bronchiectasis with worsening of the left upper lobe lingular opacity and scattered opacities throughout the right middle lobe, compared to the prior exam.  There has been development of a right posterior lower lobe opacity which measures 1.3 cm (axial image 42). Overall there has been interval increase in the numerous bilateral scattered nodular opacities throughout the lungs compared to the prior study.  No pneumothorax or pleural effusion.    SOFT TISSUES, SKELETAL and UPPER ABDOMEN: Limited images of the upper abdomen demonstrate bilateral adrenal thickening, otherwise unremarkable.  No aggressive appearing osseous lesion.    IMPRESSION:  1.No acute pulmonary thromboembolism.  2. Overall interval worsening in diffuse bilateral nodular opacities and infiltrates in the lingula and right middle lobe with development of mediastinal adenopathy.  This is concerning for a worsening atypical infectious process such as NELIDA. Correlation with bronchoalveolar lavage is recommended.      :

## 2024-04-04 ENCOUNTER — HOSPITAL ENCOUNTER (INPATIENT)
Age: 84
LOS: 5 days | Discharge: NURSING, REHAB OR LONG-TERM CARE | DRG: 522 | End: 2024-04-09
Attending: FAMILY MEDICINE | Admitting: FAMILY MEDICINE
Payer: MEDICARE

## 2024-04-04 ENCOUNTER — TELEPHONE (OUTPATIENT)
Dept: INTERNAL MEDICINE | Age: 84
End: 2024-04-04

## 2024-04-04 DIAGNOSIS — S72.001A HIP FRACTURE REQUIRING OPERATIVE REPAIR, RIGHT, CLOSED, INITIAL ENCOUNTER (HCC): Primary | ICD-10-CM

## 2024-04-04 LAB
ABO + RH BLD: NORMAL
ALBUMIN SERPL-MCNC: 3.1 G/DL (ref 3.2–4.6)
ALBUMIN/GLOB SERPL: 0.8 (ref 0.4–1.6)
ALP SERPL-CCNC: 69 U/L (ref 50–136)
ALT SERPL-CCNC: 18 U/L (ref 12–65)
ANION GAP SERPL CALC-SCNC: 2 MMOL/L (ref 2–11)
AST SERPL-CCNC: 14 U/L (ref 15–37)
BILIRUB DIRECT SERPL-MCNC: 0.2 MG/DL
BILIRUB SERPL-MCNC: 0.7 MG/DL (ref 0.2–1.1)
BLOOD GROUP ANTIBODIES SERPL: NORMAL
BUN SERPL-MCNC: 20 MG/DL (ref 8–23)
CALCIUM SERPL-MCNC: 8.9 MG/DL (ref 8.3–10.4)
CHLORIDE SERPL-SCNC: 109 MMOL/L (ref 103–113)
CO2 SERPL-SCNC: 27 MMOL/L (ref 21–32)
CREAT SERPL-MCNC: 1.2 MG/DL (ref 0.6–1)
GLOBULIN SER CALC-MCNC: 3.8 G/DL (ref 2.8–4.5)
GLUCOSE SERPL-MCNC: 130 MG/DL (ref 65–100)
HCT VFR BLD AUTO: 38.5 % (ref 35.8–46.3)
HGB BLD-MCNC: 12.8 G/DL (ref 11.7–15.4)
INR PPP: 1.1
POTASSIUM SERPL-SCNC: 4.1 MMOL/L (ref 3.5–5.1)
PROT SERPL-MCNC: 6.9 G/DL (ref 6.3–8.2)
PROTHROMBIN TIME: 14.3 SEC (ref 11.3–14.9)
SODIUM SERPL-SCNC: 138 MMOL/L (ref 136–146)
SPECIMEN EXP DATE BLD: NORMAL

## 2024-04-04 PROCEDURE — 85014 HEMATOCRIT: CPT

## 2024-04-04 PROCEDURE — 80048 BASIC METABOLIC PNL TOTAL CA: CPT

## 2024-04-04 PROCEDURE — 85610 PROTHROMBIN TIME: CPT

## 2024-04-04 PROCEDURE — 2500000003 HC RX 250 WO HCPCS: Performed by: FAMILY MEDICINE

## 2024-04-04 PROCEDURE — 36415 COLL VENOUS BLD VENIPUNCTURE: CPT

## 2024-04-04 PROCEDURE — 86900 BLOOD TYPING SEROLOGIC ABO: CPT

## 2024-04-04 PROCEDURE — 1100000000 HC RM PRIVATE

## 2024-04-04 PROCEDURE — 2580000003 HC RX 258: Performed by: FAMILY MEDICINE

## 2024-04-04 PROCEDURE — 80076 HEPATIC FUNCTION PANEL: CPT

## 2024-04-04 PROCEDURE — 6370000000 HC RX 637 (ALT 250 FOR IP): Performed by: FAMILY MEDICINE

## 2024-04-04 PROCEDURE — 86901 BLOOD TYPING SEROLOGIC RH(D): CPT

## 2024-04-04 PROCEDURE — 86850 RBC ANTIBODY SCREEN: CPT

## 2024-04-04 PROCEDURE — 85018 HEMOGLOBIN: CPT

## 2024-04-04 RX ORDER — ACETAMINOPHEN 325 MG/1
650 TABLET ORAL EVERY 6 HOURS PRN
Status: DISCONTINUED | OUTPATIENT
Start: 2024-04-04 | End: 2024-04-09 | Stop reason: HOSPADM

## 2024-04-04 RX ORDER — MORPHINE SULFATE 2 MG/ML
2 INJECTION, SOLUTION INTRAMUSCULAR; INTRAVENOUS EVERY 4 HOURS PRN
Status: DISCONTINUED | OUTPATIENT
Start: 2024-04-04 | End: 2024-04-04

## 2024-04-04 RX ORDER — POLYETHYLENE GLYCOL 3350 17 G/17G
17 POWDER, FOR SOLUTION ORAL DAILY PRN
Status: DISCONTINUED | OUTPATIENT
Start: 2024-04-04 | End: 2024-04-09 | Stop reason: HOSPADM

## 2024-04-04 RX ORDER — ACETAMINOPHEN 650 MG/1
650 SUPPOSITORY RECTAL EVERY 6 HOURS PRN
Status: DISCONTINUED | OUTPATIENT
Start: 2024-04-04 | End: 2024-04-09 | Stop reason: HOSPADM

## 2024-04-04 RX ORDER — SODIUM CHLORIDE 0.9 % (FLUSH) 0.9 %
5-40 SYRINGE (ML) INJECTION EVERY 12 HOURS SCHEDULED
Status: DISCONTINUED | OUTPATIENT
Start: 2024-04-04 | End: 2024-04-09 | Stop reason: HOSPADM

## 2024-04-04 RX ORDER — SODIUM CHLORIDE 9 MG/ML
INJECTION, SOLUTION INTRAVENOUS PRN
Status: DISCONTINUED | OUTPATIENT
Start: 2024-04-04 | End: 2024-04-09 | Stop reason: HOSPADM

## 2024-04-04 RX ORDER — VITAMIN B COMPLEX
1000 TABLET ORAL DAILY
Status: DISCONTINUED | OUTPATIENT
Start: 2024-04-05 | End: 2024-04-09 | Stop reason: HOSPADM

## 2024-04-04 RX ORDER — ONDANSETRON 2 MG/ML
4 INJECTION INTRAMUSCULAR; INTRAVENOUS EVERY 6 HOURS PRN
Status: DISCONTINUED | OUTPATIENT
Start: 2024-04-04 | End: 2024-04-09 | Stop reason: HOSPADM

## 2024-04-04 RX ORDER — LEVOTHYROXINE SODIUM 0.05 MG/1
50 TABLET ORAL
Status: DISCONTINUED | OUTPATIENT
Start: 2024-04-05 | End: 2024-04-09 | Stop reason: HOSPADM

## 2024-04-04 RX ORDER — POTASSIUM CHLORIDE 20 MEQ/1
40 TABLET, EXTENDED RELEASE ORAL PRN
Status: DISCONTINUED | OUTPATIENT
Start: 2024-04-04 | End: 2024-04-09 | Stop reason: HOSPADM

## 2024-04-04 RX ORDER — LOSARTAN POTASSIUM 50 MG/1
50 TABLET ORAL DAILY
Status: DISCONTINUED | OUTPATIENT
Start: 2024-04-05 | End: 2024-04-09 | Stop reason: HOSPADM

## 2024-04-04 RX ORDER — ONDANSETRON 4 MG/1
4 TABLET, ORALLY DISINTEGRATING ORAL EVERY 8 HOURS PRN
Status: DISCONTINUED | OUTPATIENT
Start: 2024-04-04 | End: 2024-04-09 | Stop reason: HOSPADM

## 2024-04-04 RX ORDER — LANOLIN ALCOHOL/MO/W.PET/CERES
3 CREAM (GRAM) TOPICAL NIGHTLY PRN
Status: DISCONTINUED | OUTPATIENT
Start: 2024-04-04 | End: 2024-04-09 | Stop reason: HOSPADM

## 2024-04-04 RX ORDER — MORPHINE SULFATE 2 MG/ML
2 INJECTION, SOLUTION INTRAMUSCULAR; INTRAVENOUS EVERY 4 HOURS PRN
Status: DISCONTINUED | OUTPATIENT
Start: 2024-04-04 | End: 2024-04-05

## 2024-04-04 RX ORDER — BUSPIRONE HYDROCHLORIDE 10 MG/1
15 TABLET ORAL 3 TIMES DAILY
Status: DISCONTINUED | OUTPATIENT
Start: 2024-04-04 | End: 2024-04-09 | Stop reason: HOSPADM

## 2024-04-04 RX ORDER — METOPROLOL SUCCINATE 50 MG/1
50 TABLET, EXTENDED RELEASE ORAL DAILY
Status: DISCONTINUED | OUTPATIENT
Start: 2024-04-05 | End: 2024-04-09 | Stop reason: HOSPADM

## 2024-04-04 RX ORDER — POTASSIUM CHLORIDE 7.45 MG/ML
10 INJECTION INTRAVENOUS PRN
Status: DISCONTINUED | OUTPATIENT
Start: 2024-04-04 | End: 2024-04-09 | Stop reason: HOSPADM

## 2024-04-04 RX ORDER — SODIUM CHLORIDE 0.9 % (FLUSH) 0.9 %
5-40 SYRINGE (ML) INJECTION PRN
Status: DISCONTINUED | OUTPATIENT
Start: 2024-04-04 | End: 2024-04-09 | Stop reason: HOSPADM

## 2024-04-04 RX ORDER — MAGNESIUM SULFATE IN WATER 40 MG/ML
2000 INJECTION, SOLUTION INTRAVENOUS PRN
Status: DISCONTINUED | OUTPATIENT
Start: 2024-04-04 | End: 2024-04-09 | Stop reason: HOSPADM

## 2024-04-04 RX ORDER — TRAMADOL HYDROCHLORIDE 50 MG/1
25 TABLET ORAL EVERY 4 HOURS PRN
Status: DISCONTINUED | OUTPATIENT
Start: 2024-04-04 | End: 2024-04-09 | Stop reason: HOSPADM

## 2024-04-04 RX ADMIN — TRAMADOL HYDROCHLORIDE 25 MG: 50 TABLET ORAL at 22:32

## 2024-04-04 RX ADMIN — TUBERCULIN PURIFIED PROTEIN DERIVATIVE 5 UNITS: 5 INJECTION, SOLUTION INTRADERMAL at 22:39

## 2024-04-04 RX ADMIN — BUSPIRONE HYDROCHLORIDE 15 MG: 10 TABLET ORAL at 22:24

## 2024-04-04 RX ADMIN — SODIUM CHLORIDE, PRESERVATIVE FREE 5 ML: 5 INJECTION INTRAVENOUS at 22:24

## 2024-04-04 ASSESSMENT — PAIN SCALES - GENERAL
PAINLEVEL_OUTOF10: 0
PAINLEVEL_OUTOF10: 0
PAINLEVEL_OUTOF10: 5

## 2024-04-04 ASSESSMENT — PAIN DESCRIPTION - DESCRIPTORS: DESCRIPTORS: ACHING

## 2024-04-04 ASSESSMENT — PAIN DESCRIPTION - ONSET: ONSET: GRADUAL

## 2024-04-04 ASSESSMENT — PAIN DESCRIPTION - FREQUENCY: FREQUENCY: INTERMITTENT

## 2024-04-04 ASSESSMENT — PAIN DESCRIPTION - ORIENTATION: ORIENTATION: RIGHT

## 2024-04-04 ASSESSMENT — PAIN DESCRIPTION - PAIN TYPE: TYPE: ACUTE PAIN

## 2024-04-04 ASSESSMENT — PAIN - FUNCTIONAL ASSESSMENT: PAIN_FUNCTIONAL_ASSESSMENT: PREVENTS OR INTERFERES SOME ACTIVE ACTIVITIES AND ADLS

## 2024-04-04 ASSESSMENT — PAIN DESCRIPTION - LOCATION: LOCATION: HIP

## 2024-04-04 NOTE — TELEPHONE ENCOUNTER
Contacted from transfer center regarding transfer from [facility] to [facility].    Hx of HTN. Fell after missing a step. Hip pain. Plain films with R. hip Fx. Ortho plans to see while here.     CO2 of 10 and AG 25. Glucose mid 100s. EtOH neg. Ketones neg. No lactic acid - he is going to add on. But no WBC elevation, no clinical acidosis. Blood glucose 116. H&H normal.     Agree to accept. Will see on arrival here.

## 2024-04-05 ENCOUNTER — ANESTHESIA (OUTPATIENT)
Dept: SURGERY | Age: 84
End: 2024-04-05
Payer: MEDICARE

## 2024-04-05 ENCOUNTER — APPOINTMENT (OUTPATIENT)
Dept: GENERAL RADIOLOGY | Age: 84
DRG: 522 | End: 2024-04-05
Attending: FAMILY MEDICINE
Payer: MEDICARE

## 2024-04-05 ENCOUNTER — ANESTHESIA EVENT (OUTPATIENT)
Dept: SURGERY | Age: 84
End: 2024-04-05
Payer: MEDICARE

## 2024-04-05 LAB
ANION GAP SERPL CALC-SCNC: 4 MMOL/L (ref 2–11)
BASOPHILS # BLD: 0 K/UL (ref 0–0.2)
BASOPHILS NFR BLD: 0 % (ref 0–2)
BUN SERPL-MCNC: 19 MG/DL (ref 8–23)
CALCIUM SERPL-MCNC: 9 MG/DL (ref 8.3–10.4)
CHLORIDE SERPL-SCNC: 106 MMOL/L (ref 103–113)
CO2 SERPL-SCNC: 26 MMOL/L (ref 21–32)
CREAT SERPL-MCNC: 1.2 MG/DL (ref 0.6–1)
DIFFERENTIAL METHOD BLD: ABNORMAL
EOSINOPHIL # BLD: 0.1 K/UL (ref 0–0.8)
EOSINOPHIL NFR BLD: 1 % (ref 0.5–7.8)
ERYTHROCYTE [DISTWIDTH] IN BLOOD BY AUTOMATED COUNT: 12.5 % (ref 11.9–14.6)
GLUCOSE SERPL-MCNC: 108 MG/DL (ref 65–100)
HCT VFR BLD AUTO: 39.1 % (ref 35.8–46.3)
HGB BLD-MCNC: 13.1 G/DL (ref 11.7–15.4)
IMM GRANULOCYTES # BLD AUTO: 0.2 K/UL (ref 0–0.5)
IMM GRANULOCYTES NFR BLD AUTO: 1 % (ref 0–5)
LYMPHOCYTES # BLD: 0.9 K/UL (ref 0.5–4.6)
LYMPHOCYTES NFR BLD: 6 % (ref 13–44)
MCH RBC QN AUTO: 31 PG (ref 26.1–32.9)
MCHC RBC AUTO-ENTMCNC: 33.5 G/DL (ref 31.4–35)
MCV RBC AUTO: 92.7 FL (ref 82–102)
MM INDURATION, POC: 0 MM (ref 0–5)
MONOCYTES # BLD: 1.7 K/UL (ref 0.1–1.3)
MONOCYTES NFR BLD: 10 % (ref 4–12)
NEUTS SEG # BLD: 13.5 K/UL (ref 1.7–8.2)
NEUTS SEG NFR BLD: 82 % (ref 43–78)
NRBC # BLD: 0 K/UL (ref 0–0.2)
PLATELET # BLD AUTO: 225 K/UL (ref 150–450)
PMV BLD AUTO: 12.7 FL (ref 9.4–12.3)
POTASSIUM SERPL-SCNC: 4 MMOL/L (ref 3.5–5.1)
PPD, POC: NEGATIVE
RBC # BLD AUTO: 4.22 M/UL (ref 4.05–5.2)
SODIUM SERPL-SCNC: 136 MMOL/L (ref 136–146)
WBC # BLD AUTO: 16.4 K/UL (ref 4.3–11.1)

## 2024-04-05 PROCEDURE — 1100000003 HC PRIVATE W/ TELEMETRY

## 2024-04-05 PROCEDURE — 6370000000 HC RX 637 (ALT 250 FOR IP): Performed by: FAMILY MEDICINE

## 2024-04-05 PROCEDURE — 7100000001 HC PACU RECOVERY - ADDTL 15 MIN: Performed by: ORTHOPAEDIC SURGERY

## 2024-04-05 PROCEDURE — 2500000003 HC RX 250 WO HCPCS: Performed by: NURSE ANESTHETIST, CERTIFIED REGISTERED

## 2024-04-05 PROCEDURE — 80048 BASIC METABOLIC PNL TOTAL CA: CPT

## 2024-04-05 PROCEDURE — 2720000010 HC SURG SUPPLY STERILE: Performed by: ORTHOPAEDIC SURGERY

## 2024-04-05 PROCEDURE — 3600000015 HC SURGERY LEVEL 5 ADDTL 15MIN: Performed by: ORTHOPAEDIC SURGERY

## 2024-04-05 PROCEDURE — 72170 X-RAY EXAM OF PELVIS: CPT

## 2024-04-05 PROCEDURE — 2580000003 HC RX 258: Performed by: ANESTHESIOLOGY

## 2024-04-05 PROCEDURE — 3700000001 HC ADD 15 MINUTES (ANESTHESIA): Performed by: ORTHOPAEDIC SURGERY

## 2024-04-05 PROCEDURE — 51798 US URINE CAPACITY MEASURE: CPT

## 2024-04-05 PROCEDURE — 2580000003 HC RX 258: Performed by: FAMILY MEDICINE

## 2024-04-05 PROCEDURE — 3700000000 HC ANESTHESIA ATTENDED CARE: Performed by: ORTHOPAEDIC SURGERY

## 2024-04-05 PROCEDURE — 3600000005 HC SURGERY LEVEL 5 BASE: Performed by: ORTHOPAEDIC SURGERY

## 2024-04-05 PROCEDURE — 7100000000 HC PACU RECOVERY - FIRST 15 MIN: Performed by: ORTHOPAEDIC SURGERY

## 2024-04-05 PROCEDURE — 6370000000 HC RX 637 (ALT 250 FOR IP): Performed by: ORTHOPAEDIC SURGERY

## 2024-04-05 PROCEDURE — 2580000003 HC RX 258: Performed by: ORTHOPAEDIC SURGERY

## 2024-04-05 PROCEDURE — 85025 COMPLETE CBC W/AUTO DIFF WBC: CPT

## 2024-04-05 PROCEDURE — 6360000002 HC RX W HCPCS: Performed by: ORTHOPAEDIC SURGERY

## 2024-04-05 PROCEDURE — 0SRR01A REPLACEMENT OF RIGHT HIP JOINT, FEMORAL SURFACE WITH METAL SYNTHETIC SUBSTITUTE, UNCEMENTED, OPEN APPROACH: ICD-10-PCS | Performed by: ORTHOPAEDIC SURGERY

## 2024-04-05 PROCEDURE — 2709999900 HC NON-CHARGEABLE SUPPLY: Performed by: ORTHOPAEDIC SURGERY

## 2024-04-05 PROCEDURE — C1776 JOINT DEVICE (IMPLANTABLE): HCPCS | Performed by: ORTHOPAEDIC SURGERY

## 2024-04-05 PROCEDURE — 36415 COLL VENOUS BLD VENIPUNCTURE: CPT

## 2024-04-05 PROCEDURE — 6360000002 HC RX W HCPCS: Performed by: ANESTHESIOLOGY

## 2024-04-05 PROCEDURE — 6360000002 HC RX W HCPCS: Performed by: NURSE ANESTHETIST, CERTIFIED REGISTERED

## 2024-04-05 DEVICE — ARTICUL/EZE FEMORAL HEAD DIAMETER 28MM +5 12/14 TAPER
Type: IMPLANTABLE DEVICE | Site: HIP | Status: FUNCTIONAL
Brand: ARTICUL/EZE

## 2024-04-05 DEVICE — SELF CENTERING BI-POLAR HEAD 28MM ID 44MM OD
Type: IMPLANTABLE DEVICE | Site: HIP | Status: FUNCTIONAL
Brand: SELF CENTERING

## 2024-04-05 DEVICE — EMPHASYS 12/14 FEMORAL STEM COLLARED HIGH OFFSET HA COATED 5 HO
Type: IMPLANTABLE DEVICE | Site: HIP | Status: FUNCTIONAL
Brand: EMPHASYS

## 2024-04-05 DEVICE — HIP H4 HEMI UNI BIPLR IMPL CAPPED H4: Type: IMPLANTABLE DEVICE | Site: HIP | Status: FUNCTIONAL

## 2024-04-05 RX ORDER — SODIUM CHLORIDE 0.9 % (FLUSH) 0.9 %
5-40 SYRINGE (ML) INJECTION PRN
Status: DISCONTINUED | OUTPATIENT
Start: 2024-04-05 | End: 2024-04-09 | Stop reason: HOSPADM

## 2024-04-05 RX ORDER — MIDAZOLAM HYDROCHLORIDE 2 MG/2ML
2 INJECTION, SOLUTION INTRAMUSCULAR; INTRAVENOUS
Status: DISCONTINUED | OUTPATIENT
Start: 2024-04-05 | End: 2024-04-05 | Stop reason: HOSPADM

## 2024-04-05 RX ORDER — SODIUM CHLORIDE 9 MG/ML
INJECTION, SOLUTION INTRAVENOUS PRN
Status: DISCONTINUED | OUTPATIENT
Start: 2024-04-05 | End: 2024-04-05 | Stop reason: HOSPADM

## 2024-04-05 RX ORDER — SODIUM CHLORIDE, SODIUM LACTATE, POTASSIUM CHLORIDE, CALCIUM CHLORIDE 600; 310; 30; 20 MG/100ML; MG/100ML; MG/100ML; MG/100ML
INJECTION, SOLUTION INTRAVENOUS CONTINUOUS
Status: DISCONTINUED | OUTPATIENT
Start: 2024-04-05 | End: 2024-04-05 | Stop reason: HOSPADM

## 2024-04-05 RX ORDER — SODIUM CHLORIDE 9 MG/ML
INJECTION, SOLUTION INTRAVENOUS CONTINUOUS
Status: DISCONTINUED | OUTPATIENT
Start: 2024-04-05 | End: 2024-04-07

## 2024-04-05 RX ORDER — HYDROMORPHONE HYDROCHLORIDE 2 MG/ML
0.5 INJECTION, SOLUTION INTRAMUSCULAR; INTRAVENOUS; SUBCUTANEOUS EVERY 5 MIN PRN
Status: DISCONTINUED | OUTPATIENT
Start: 2024-04-05 | End: 2024-04-05 | Stop reason: HOSPADM

## 2024-04-05 RX ORDER — NALOXONE HYDROCHLORIDE 0.4 MG/ML
INJECTION, SOLUTION INTRAMUSCULAR; INTRAVENOUS; SUBCUTANEOUS PRN
Status: DISCONTINUED | OUTPATIENT
Start: 2024-04-05 | End: 2024-04-05 | Stop reason: HOSPADM

## 2024-04-05 RX ORDER — SODIUM CHLORIDE 0.9 % (FLUSH) 0.9 %
5-40 SYRINGE (ML) INJECTION EVERY 12 HOURS SCHEDULED
Status: DISCONTINUED | OUTPATIENT
Start: 2024-04-05 | End: 2024-04-09 | Stop reason: HOSPADM

## 2024-04-05 RX ORDER — FAMOTIDINE 20 MG/1
20 TABLET, FILM COATED ORAL DAILY PRN
Status: DISCONTINUED | OUTPATIENT
Start: 2024-04-05 | End: 2024-04-09 | Stop reason: HOSPADM

## 2024-04-05 RX ORDER — FENTANYL CITRATE 50 UG/ML
100 INJECTION, SOLUTION INTRAMUSCULAR; INTRAVENOUS
Status: DISCONTINUED | OUTPATIENT
Start: 2024-04-05 | End: 2024-04-05 | Stop reason: HOSPADM

## 2024-04-05 RX ORDER — NEOSTIGMINE METHYLSULFATE 1 MG/ML
INJECTION, SOLUTION INTRAVENOUS PRN
Status: DISCONTINUED | OUTPATIENT
Start: 2024-04-05 | End: 2024-04-05 | Stop reason: SDUPTHER

## 2024-04-05 RX ORDER — ACETAMINOPHEN 500 MG
1000 TABLET ORAL ONCE
Status: DISCONTINUED | OUTPATIENT
Start: 2024-04-05 | End: 2024-04-05 | Stop reason: HOSPADM

## 2024-04-05 RX ORDER — FENTANYL CITRATE 50 UG/ML
INJECTION, SOLUTION INTRAMUSCULAR; INTRAVENOUS PRN
Status: DISCONTINUED | OUTPATIENT
Start: 2024-04-05 | End: 2024-04-05 | Stop reason: SDUPTHER

## 2024-04-05 RX ORDER — EPHEDRINE SULFATE 5 MG/ML
INJECTION INTRAVENOUS PRN
Status: DISCONTINUED | OUTPATIENT
Start: 2024-04-05 | End: 2024-04-05 | Stop reason: SDUPTHER

## 2024-04-05 RX ORDER — SODIUM CHLORIDE 0.9 % (FLUSH) 0.9 %
5-40 SYRINGE (ML) INJECTION PRN
Status: DISCONTINUED | OUTPATIENT
Start: 2024-04-05 | End: 2024-04-05 | Stop reason: HOSPADM

## 2024-04-05 RX ORDER — HYDROMORPHONE HYDROCHLORIDE 1 MG/ML
0.5 INJECTION, SOLUTION INTRAMUSCULAR; INTRAVENOUS; SUBCUTANEOUS
Status: DISCONTINUED | OUTPATIENT
Start: 2024-04-05 | End: 2024-04-09

## 2024-04-05 RX ORDER — ASPIRIN 325 MG
325 TABLET, DELAYED RELEASE (ENTERIC COATED) ORAL DAILY
Status: DISCONTINUED | OUTPATIENT
Start: 2024-04-05 | End: 2024-04-09 | Stop reason: HOSPADM

## 2024-04-05 RX ORDER — ONDANSETRON 2 MG/ML
INJECTION INTRAMUSCULAR; INTRAVENOUS PRN
Status: DISCONTINUED | OUTPATIENT
Start: 2024-04-05 | End: 2024-04-05 | Stop reason: SDUPTHER

## 2024-04-05 RX ORDER — SODIUM CHLORIDE 0.9 % (FLUSH) 0.9 %
5-40 SYRINGE (ML) INJECTION EVERY 12 HOURS SCHEDULED
Status: DISCONTINUED | OUTPATIENT
Start: 2024-04-05 | End: 2024-04-05 | Stop reason: HOSPADM

## 2024-04-05 RX ORDER — DIPHENHYDRAMINE HYDROCHLORIDE 50 MG/ML
12.5 INJECTION INTRAMUSCULAR; INTRAVENOUS
Status: DISCONTINUED | OUTPATIENT
Start: 2024-04-05 | End: 2024-04-05 | Stop reason: HOSPADM

## 2024-04-05 RX ORDER — PROPOFOL 10 MG/ML
INJECTION, EMULSION INTRAVENOUS PRN
Status: DISCONTINUED | OUTPATIENT
Start: 2024-04-05 | End: 2024-04-05 | Stop reason: SDUPTHER

## 2024-04-05 RX ORDER — VANCOMYCIN HYDROCHLORIDE 1 G/20ML
INJECTION, POWDER, LYOPHILIZED, FOR SOLUTION INTRAVENOUS PRN
Status: DISCONTINUED | OUTPATIENT
Start: 2024-04-05 | End: 2024-04-05 | Stop reason: HOSPADM

## 2024-04-05 RX ORDER — ROCURONIUM BROMIDE 10 MG/ML
INJECTION, SOLUTION INTRAVENOUS PRN
Status: DISCONTINUED | OUTPATIENT
Start: 2024-04-05 | End: 2024-04-05 | Stop reason: SDUPTHER

## 2024-04-05 RX ORDER — LIDOCAINE HYDROCHLORIDE 20 MG/ML
INJECTION, SOLUTION EPIDURAL; INFILTRATION; INTRACAUDAL; PERINEURAL PRN
Status: DISCONTINUED | OUTPATIENT
Start: 2024-04-05 | End: 2024-04-05 | Stop reason: SDUPTHER

## 2024-04-05 RX ORDER — OXYCODONE HYDROCHLORIDE 5 MG/1
5 TABLET ORAL
Status: DISCONTINUED | OUTPATIENT
Start: 2024-04-05 | End: 2024-04-05 | Stop reason: HOSPADM

## 2024-04-05 RX ORDER — OXYCODONE HYDROCHLORIDE 5 MG/1
5 TABLET ORAL EVERY 4 HOURS PRN
Status: DISCONTINUED | OUTPATIENT
Start: 2024-04-05 | End: 2024-04-09

## 2024-04-05 RX ORDER — ONDANSETRON 2 MG/ML
4 INJECTION INTRAMUSCULAR; INTRAVENOUS
Status: DISCONTINUED | OUTPATIENT
Start: 2024-04-05 | End: 2024-04-05 | Stop reason: HOSPADM

## 2024-04-05 RX ORDER — GLYCOPYRROLATE 0.2 MG/ML
INJECTION INTRAMUSCULAR; INTRAVENOUS PRN
Status: DISCONTINUED | OUTPATIENT
Start: 2024-04-05 | End: 2024-04-05 | Stop reason: SDUPTHER

## 2024-04-05 RX ORDER — SODIUM CHLORIDE 9 MG/ML
INJECTION, SOLUTION INTRAVENOUS PRN
Status: DISCONTINUED | OUTPATIENT
Start: 2024-04-05 | End: 2024-04-09 | Stop reason: HOSPADM

## 2024-04-05 RX ADMIN — LIDOCAINE HYDROCHLORIDE 60 MG: 20 INJECTION, SOLUTION EPIDURAL; INFILTRATION; INTRACAUDAL; PERINEURAL at 10:32

## 2024-04-05 RX ADMIN — SODIUM CHLORIDE, SODIUM LACTATE, POTASSIUM CHLORIDE, AND CALCIUM CHLORIDE: 600; 310; 30; 20 INJECTION, SOLUTION INTRAVENOUS at 10:15

## 2024-04-05 RX ADMIN — TRAMADOL HYDROCHLORIDE 25 MG: 50 TABLET ORAL at 18:07

## 2024-04-05 RX ADMIN — EPHEDRINE SULFATE 10 MG: 5 INJECTION INTRAVENOUS at 11:51

## 2024-04-05 RX ADMIN — EPHEDRINE SULFATE 10 MG: 5 INJECTION INTRAVENOUS at 10:48

## 2024-04-05 RX ADMIN — SODIUM CHLORIDE, SODIUM LACTATE, POTASSIUM CHLORIDE, AND CALCIUM CHLORIDE: 600; 310; 30; 20 INJECTION, SOLUTION INTRAVENOUS at 11:15

## 2024-04-05 RX ADMIN — BUSPIRONE HYDROCHLORIDE 15 MG: 10 TABLET ORAL at 21:22

## 2024-04-05 RX ADMIN — ASPIRIN 325 MG: 325 TABLET, COATED ORAL at 16:43

## 2024-04-05 RX ADMIN — ROCURONIUM BROMIDE 30 MG: 10 INJECTION, SOLUTION INTRAVENOUS at 10:33

## 2024-04-05 RX ADMIN — SODIUM CHLORIDE, PRESERVATIVE FREE 10 ML: 5 INJECTION INTRAVENOUS at 21:24

## 2024-04-05 RX ADMIN — LEVOTHYROXINE SODIUM 50 MCG: 0.05 TABLET ORAL at 05:37

## 2024-04-05 RX ADMIN — SODIUM CHLORIDE, PRESERVATIVE FREE 10 ML: 5 INJECTION INTRAVENOUS at 21:23

## 2024-04-05 RX ADMIN — PROPOFOL 100 MG: 10 INJECTION, EMULSION INTRAVENOUS at 10:32

## 2024-04-05 RX ADMIN — Medication 2000 MG: at 10:48

## 2024-04-05 RX ADMIN — GLYCOPYRROLATE 0.4 MG: 0.2 INJECTION INTRAMUSCULAR; INTRAVENOUS at 12:10

## 2024-04-05 RX ADMIN — FENTANYL CITRATE 100 MCG: 50 INJECTION, SOLUTION INTRAMUSCULAR; INTRAVENOUS at 10:32

## 2024-04-05 RX ADMIN — Medication 3 MG: at 12:10

## 2024-04-05 RX ADMIN — HYDROMORPHONE HYDROCHLORIDE 0.25 MG: 2 INJECTION INTRAMUSCULAR; INTRAVENOUS; SUBCUTANEOUS at 12:53

## 2024-04-05 RX ADMIN — SODIUM CHLORIDE: 9 INJECTION, SOLUTION INTRAVENOUS at 16:43

## 2024-04-05 RX ADMIN — PROPOFOL 30 MG: 10 INJECTION, EMULSION INTRAVENOUS at 11:30

## 2024-04-05 RX ADMIN — ONDANSETRON 4 MG: 2 INJECTION INTRAMUSCULAR; INTRAVENOUS at 12:10

## 2024-04-05 RX ADMIN — WATER 1000 MG: 1 INJECTION INTRAMUSCULAR; INTRAVENOUS; SUBCUTANEOUS at 16:43

## 2024-04-05 ASSESSMENT — PAIN DESCRIPTION - ORIENTATION
ORIENTATION: RIGHT
ORIENTATION: RIGHT

## 2024-04-05 ASSESSMENT — PAIN - FUNCTIONAL ASSESSMENT
PAIN_FUNCTIONAL_ASSESSMENT: 0-10

## 2024-04-05 ASSESSMENT — PAIN SCALES - GENERAL
PAINLEVEL_OUTOF10: 0
PAINLEVEL_OUTOF10: 7

## 2024-04-05 ASSESSMENT — PAIN DESCRIPTION - LOCATION
LOCATION: HIP
LOCATION: HIP

## 2024-04-05 ASSESSMENT — PAIN DESCRIPTION - DESCRIPTORS
DESCRIPTORS: ACHING
DESCRIPTORS: ACHING;BURNING

## 2024-04-05 NOTE — PERIOP NOTE
TRANSFER - OUT REPORT:    Verbal report given to Génesis NUNEZ on Heather Osei  being transferred back to [facility] for routine progression of patient care       Report consisted of patient’s Situation, Background, Assessment and   Recommendations(SBAR).     Information from the following report(s) Nurse Handoff Report, Adult Overview, Surgery Report, Intake/Output, MAR, Recent Results, Cardiac Rhythm Normal Sinus , Procedure Verification, Quality Measures, and Neuro Assessment was reviewed with the receiving nurse.    Lines:   Peripheral IV 04/04/24 Right;Ventral Forearm (Active)   Site Assessment Clean, dry & intact 04/05/24 0720   Line Status Capped 04/05/24 0720   Line Care Cap changed 04/05/24 0720   Phlebitis Assessment No symptoms 04/05/24 0720   Infiltration Assessment 0 04/05/24 0720   Alcohol Cap Used Yes 04/05/24 0720   Dressing Status Clean, dry & intact 04/05/24 0720   Dressing Type Transparent 04/05/24 0720       Peripheral IV 04/05/24 Distal;Left;Anterior;Dorsal Forearm (Active)        Opportunity for questions and clarification was provided.      Patient transported with:   Tech    VTE prophylaxis orders have been written for Heather Osei.

## 2024-04-05 NOTE — H&P
and tests:  Recent Results (from the past 24 hour(s))   Basic Metabolic Panel    Collection Time: 04/04/24  7:23 PM   Result Value Ref Range    Sodium 138 136 - 146 mmol/L    Potassium 4.1 3.5 - 5.1 mmol/L    Chloride 109 103 - 113 mmol/L    CO2 27 21 - 32 mmol/L    Anion Gap 2 2 - 11 mmol/L    Glucose 130 (H) 65 - 100 mg/dL    BUN 20 8 - 23 MG/DL    Creatinine 1.20 (H) 0.6 - 1.0 MG/DL    Est, Glom Filt Rate 44 (L) >60 ml/min/1.73m2    Calcium 8.9 8.3 - 10.4 MG/DL   Hepatic Function Panel    Collection Time: 04/04/24  7:23 PM   Result Value Ref Range    Total Protein 6.9 6.3 - 8.2 g/dL    Albumin 3.1 (L) 3.2 - 4.6 g/dL    Globulin 3.8 2.8 - 4.5 g/dL    Albumin/Globulin Ratio 0.8 0.4 - 1.6      Total Bilirubin 0.7 0.2 - 1.1 MG/DL    Bilirubin, Direct 0.2 <0.4 MG/DL    Alk Phosphatase 69 50 - 136 U/L    AST 14 (L) 15 - 37 U/L    ALT 18 12 - 65 U/L   Protime-INR    Collection Time: 04/04/24  7:23 PM   Result Value Ref Range    Protime 14.3 11.3 - 14.9 sec    INR 1.1     Hemoglobin and Hematocrit    Collection Time: 04/04/24  7:23 PM   Result Value Ref Range    Hemoglobin 12.8 11.7 - 15.4 g/dL    Hematocrit 38.5 35.8 - 46.3 %   TYPE AND SCREEN    Collection Time: 04/04/24  7:35 PM   Result Value Ref Range    Crossmatch expiration date 04/07/2024,2359     ABO/Rh O POSITIVE     Antibody Screen NEG        No results for input(s): \"COVID19\" in the last 72 hours.    XR HIP 2-3 VW W PELVIS RIGHT    Result Date: 4/4/2024    EXAM: XR HIP 1 VW RIGHT W PELVIS, 4/4/2024 12:39 PM INDICATION: ;fracture; Comparison: None FINDINGS: AP VIEW PELVIS SHOWS RIGHT FEMORAL NECK FRACTURE. FEMORAL HEAD maintains alignment with the acetabulum. Left proximal femur and bony pelvic ring appear intact. CONCLUSION: 1. Acute fracture right femoral neck. Mild comminution. Signed by: 4/4/2024 1:46 PM: MARIELENA Lam MD    XR FEMUR RIGHT (MIN 2 VIEWS)    Result Date: 4/4/2024    EXAM: XR FEMUR 2+ VW RIGHT, 4/4/2024 12:39 PM INDICATION: ;fracture;

## 2024-04-05 NOTE — OP NOTE
Operative Report    Patient: Heather Osei MRN: [id number]  SSN: [id number]    YOB: 1940  Age: 83 y.o.  Sex: female       Date of Surgery: April 5, 2024     History:  Heather Osei is a 83 y.o. female with displaced fracture of the right femoral neck.      I have  talked to the patient and/or their representative and explained the exact nature the procedure.  I also went through a detailed list of the material risks associated with  the procedure which included risk of bleeding, infection, injury to nearby structures, worsening the situation, as well as the risks associate with anesthesia and finally death.  Also talked with him regarding the benefits and alternatives to the procedure.    Preoperative Diagnosis: Displaced fracture right femoral neck    Postoperative Diagnosis:   Same      Surgeon(s) and Role:     * Bismark Nielsen MD - Primary    Anesthesia: General     Procedure: Hemiarthroplasty right hip using uncemented DePuy components    Procedure in Detail: After induction general anesthesia patient placed in right lateral position supported by me to positioners appropriate amount of padding was placed underneath the pegs and underneath the well leg and axillary roll was placed.  Right hip and right lower extremity thoroughly prepped with alcohol and ChloraPrep and draped in routine fashion.  Standard lateral incision made for a posterior approach.  Incision deepened through subcutaneous tissue and fascia and fascia of the gluteus nasrin and the fibers of the gluteus nasrin was  getting hemostasis at the same time.  Charnley retractor was introduced hip was internally rotated and the short external rotators identified and a cuff of short external rotators and capsule developed in a inverted L-shaped incision through the superior portion of the capsule and then along the intertrochanteric line and held with stay sutures hemostasis obtained with cautery.  Patient had quite

## 2024-04-05 NOTE — ANESTHESIA POSTPROCEDURE EVALUATION
Department of Anesthesiology  Postprocedure Note    Patient: Heather Osei  MRN: [id number]  YOB: 1940  Date of evaluation: 4/5/2024    Procedure Summary       Date: 04/05/24 Room / Location: [facility] MAIN OR 03 / [facility] MAIN OR    Anesthesia Start: 1023 Anesthesia Stop: 1224    Procedure: BIPOLAR HIP WITH DEPUY (Right: Hip) Diagnosis:       Closed fracture of right hip requiring operative repair, initial encounter ([facility])      (Closed fracture of right hip requiring operative repair, initial encounter ([facility]) [S72.001A])    Providers: Bismark Nielsen MD Responsible Provider: Eliot Garcia MD    Anesthesia Type: General ASA Status: 2            Anesthesia Type: General    Cierra Phase I: Cierra Score: 7    Cierra Phase II:      Anesthesia Post Evaluation    Patient location during evaluation: PACU  Patient participation: complete - patient participated  Level of consciousness: awake and alert  Airway patency: patent  Nausea & Vomiting: no nausea and no vomiting  Cardiovascular status: hemodynamically stable  Respiratory status: acceptable, nonlabored ventilation and spontaneous ventilation  Hydration status: euvolemic  Comments: BP (!) 143/56   Pulse 84   Temp 98 °F (36.7 °C) (Temporal)   Resp 14   Ht 1.626 m (5' 4\")   Wt 47.2 kg (104 lb)   SpO2 100%   BMI 17.85 kg/m²     Multimodal analgesia pain management approach  Pain management: adequate and satisfactory to patient    No notable events documented.

## 2024-04-05 NOTE — CONSULTS
Consult    Patient: Heather Osei MRN: [id number]  SSN: [id number]    YOB: 1940  Age: 83 y.o.  Sex: female      Subjective:      Heather Osei is a 83 y.o. female admitted with displaced left femoral neck fracture right femur she fell going down some stairs to the basement and she misjudged the step resulting in the fall and was seen at the urgent care center and then transferred here for definitive care.  No recent surgery or hospitalization..  She lives alone and takes care of herself.  Her son lives in town.    Past Medical History:   Diagnosis Date    Hx of colonoscopy 2014    Normal    Mammogram declined     Medicare annual wellness visit, subsequent     Postmenopausal osteoporosis      Past Surgical History:   Procedure Laterality Date    DILATION AND CURETTAGE OF UTERUS      PARTIAL HYSTERECTOMY (CERVIX NOT REMOVED)  1988    still has ovaries      FAMHX -No history of inflammatory arthritis     Social History     Tobacco Use    Smoking status: Never    Smokeless tobacco: Never   Substance Use Topics    Alcohol use: No      Current Facility-Administered Medications   Medication Dose Route Frequency Provider Last Rate Last Admin    sodium chloride flush 0.9 % injection 5-40 mL  5-40 mL IntraVENous 2 times per day Yonatan Randall DO   5 mL at 04/04/24 2224    sodium chloride flush 0.9 % injection 5-40 mL  5-40 mL IntraVENous PRN Yonatan Randall DO        0.9 % sodium chloride infusion   IntraVENous PRN Yonatan Randall DO        potassium chloride (KLOR-CON M) extended release tablet 40 mEq  40 mEq Oral PRN Yonatan Randall DO        Or    potassium bicarb-citric acid (EFFER-K) effervescent tablet 40 mEq  40 mEq Oral PRN Yonatan Randall DO        Or    potassium chloride 10 mEq/100 mL IVPB (Peripheral Line)  10 mEq IntraVENous PRN Yonatan Randall DO        magnesium sulfate 2000 mg in 50 mL IVPB premix  2,000 mg IntraVENous PRN Yonatan Randall DO        ondansetron (ZOFRAN-ODT)

## 2024-04-05 NOTE — ANESTHESIA PRE PROCEDURE
Department of Anesthesiology  Preprocedure Note       Name:  Heather Osei   Age:  83 y.o.  :  1940                                          MRN:  [id number]         Date:  2024      Surgeon: Surgeon(s):  Bismark Nielsen MD    Procedure: Procedure(s):  BIPOLAR HIP WITH DEPUY    Medications prior to admission:   Prior to Admission medications    Medication Sig Start Date End Date Taking? Authorizing Provider   busPIRone (BUSPAR) 15 MG tablet Take 15 mg by mouth 3 times daily 24   Jason Hurd MD   levothyroxine (SYNTHROID) 50 MCG tablet Take 1 tablet by mouth every morning (before breakfast) 24   Jason Hurd MD   losartan (COZAAR) 50 MG tablet Take 1 tablet by mouth daily 24   Jason Hurd MD   metoprolol succinate (TOPROL XL) 50 MG extended release tablet Take 1 tablet by mouth daily 24   Jason Hurd MD   vitamin D (CHOLECALCIFEROL) 25 MCG (1000 UT) TABS tablet Take 1 tablet by mouth    Automatic Reconciliation, Ar       Current medications:    Current Facility-Administered Medications   Medication Dose Route Frequency Provider Last Rate Last Admin   • famotidine (PEPCID) tablet 20 mg  20 mg Oral Daily PRN Hannah Lorenzo PA       • sodium chloride flush 0.9 % injection 5-40 mL  5-40 mL IntraVENous 2 times per day Yonatan Randall, DO   5 mL at 244   • sodium chloride flush 0.9 % injection 5-40 mL  5-40 mL IntraVENous PRN Yonatan Randall, DO       • 0.9 % sodium chloride infusion   IntraVENous PRN Yonatan Randall, DO       • potassium chloride (KLOR-CON M) extended release tablet 40 mEq  40 mEq Oral PRN Yonatan Randall, DO        Or   • potassium bicarb-citric acid (EFFER-K) effervescent tablet 40 mEq  40 mEq Oral PRN Yonatan Randall, DO        Or   • potassium chloride 10 mEq/100 mL IVPB (Peripheral Line)  10 mEq IntraVENous PRN Yonatan Randall, DO       • magnesium sulfate 2000 mg in 50 mL IVPB premix  2,000 mg IntraVENous PRN Yonatan Randall, DO

## 2024-04-05 NOTE — PLAN OF CARE
Problem: Discharge Planning  Goal: Discharge to home or other facility with appropriate resources  Outcome: Progressing  Flowsheets  Taken 4/4/2024 2041 by Stephany Gallardo, RN  Discharge to home or other facility with appropriate resources: Identify barriers to discharge with patient and caregiver  Taken 4/4/2024 1830 by Jackelyn Ann RN  Discharge to home or other facility with appropriate resources: Identify barriers to discharge with patient and caregiver     Problem: Skin/Tissue Integrity  Goal: Absence of new skin breakdown  Description: 1.  Monitor for areas of redness and/or skin breakdown  2.  Assess vascular access sites hourly  3.  Every 4-6 hours minimum:  Change oxygen saturation probe site  4.  Every 4-6 hours:  If on nasal continuous positive airway pressure, respiratory therapy assess nares and determine need for appliance change or resting period.  Outcome: Progressing     Problem: Safety - Adult  Goal: Free from fall injury  Outcome: Progressing     Problem: ABCDS Injury Assessment  Goal: Absence of physical injury  Outcome: Progressing     Problem: Pain  Goal: Verbalizes/displays adequate comfort level or baseline comfort level  Outcome: Progressing

## 2024-04-06 PROBLEM — D72.829 LEUKOCYTOSIS: Status: ACTIVE | Noted: 2024-04-06

## 2024-04-06 PROBLEM — D62 ACUTE BLOOD LOSS AS CAUSE OF POSTOPERATIVE ANEMIA: Status: ACTIVE | Noted: 2024-04-06

## 2024-04-06 LAB
ANION GAP SERPL CALC-SCNC: 7 MMOL/L (ref 2–11)
BUN SERPL-MCNC: 12 MG/DL (ref 8–23)
CALCIUM SERPL-MCNC: 8.2 MG/DL (ref 8.3–10.4)
CHLORIDE SERPL-SCNC: 106 MMOL/L (ref 103–113)
CO2 SERPL-SCNC: 21 MMOL/L (ref 21–32)
CREAT SERPL-MCNC: 0.9 MG/DL (ref 0.6–1)
GLUCOSE SERPL-MCNC: 113 MG/DL (ref 65–100)
HCT VFR BLD AUTO: 34.5 % (ref 35.8–46.3)
HGB BLD-MCNC: 11.6 G/DL (ref 11.7–15.4)
POTASSIUM SERPL-SCNC: 4.2 MMOL/L (ref 3.5–5.1)
SODIUM SERPL-SCNC: 134 MMOL/L (ref 136–146)

## 2024-04-06 PROCEDURE — 51798 US URINE CAPACITY MEASURE: CPT

## 2024-04-06 PROCEDURE — 6360000002 HC RX W HCPCS: Performed by: FAMILY MEDICINE

## 2024-04-06 PROCEDURE — 6370000000 HC RX 637 (ALT 250 FOR IP): Performed by: FAMILY MEDICINE

## 2024-04-06 PROCEDURE — 85014 HEMATOCRIT: CPT

## 2024-04-06 PROCEDURE — 6370000000 HC RX 637 (ALT 250 FOR IP): Performed by: ORTHOPAEDIC SURGERY

## 2024-04-06 PROCEDURE — 97165 OT EVAL LOW COMPLEX 30 MIN: CPT

## 2024-04-06 PROCEDURE — 1100000000 HC RM PRIVATE

## 2024-04-06 PROCEDURE — 6360000002 HC RX W HCPCS: Performed by: ORTHOPAEDIC SURGERY

## 2024-04-06 PROCEDURE — 36415 COLL VENOUS BLD VENIPUNCTURE: CPT

## 2024-04-06 PROCEDURE — 97535 SELF CARE MNGMENT TRAINING: CPT

## 2024-04-06 PROCEDURE — 97161 PT EVAL LOW COMPLEX 20 MIN: CPT

## 2024-04-06 PROCEDURE — 2580000003 HC RX 258: Performed by: FAMILY MEDICINE

## 2024-04-06 PROCEDURE — 85018 HEMOGLOBIN: CPT

## 2024-04-06 PROCEDURE — 97530 THERAPEUTIC ACTIVITIES: CPT

## 2024-04-06 PROCEDURE — 80048 BASIC METABOLIC PNL TOTAL CA: CPT

## 2024-04-06 PROCEDURE — 2580000003 HC RX 258: Performed by: ORTHOPAEDIC SURGERY

## 2024-04-06 RX ADMIN — ONDANSETRON 4 MG: 2 INJECTION INTRAMUSCULAR; INTRAVENOUS at 05:00

## 2024-04-06 RX ADMIN — SODIUM CHLORIDE, PRESERVATIVE FREE 10 ML: 5 INJECTION INTRAVENOUS at 08:39

## 2024-04-06 RX ADMIN — SODIUM CHLORIDE, PRESERVATIVE FREE 10 ML: 5 INJECTION INTRAVENOUS at 20:58

## 2024-04-06 RX ADMIN — LEVOTHYROXINE SODIUM 50 MCG: 0.05 TABLET ORAL at 06:15

## 2024-04-06 RX ADMIN — SODIUM CHLORIDE: 9 INJECTION, SOLUTION INTRAVENOUS at 08:36

## 2024-04-06 RX ADMIN — BUSPIRONE HYDROCHLORIDE 15 MG: 10 TABLET ORAL at 20:55

## 2024-04-06 RX ADMIN — SODIUM CHLORIDE: 9 INJECTION, SOLUTION INTRAVENOUS at 00:35

## 2024-04-06 RX ADMIN — LOSARTAN POTASSIUM 50 MG: 50 TABLET, FILM COATED ORAL at 08:37

## 2024-04-06 RX ADMIN — METOPROLOL SUCCINATE 50 MG: 50 TABLET, FILM COATED, EXTENDED RELEASE ORAL at 08:37

## 2024-04-06 RX ADMIN — VITAMIN D, TAB 1000IU (100/BT) 1000 UNITS: 25 TAB at 08:37

## 2024-04-06 RX ADMIN — BUSPIRONE HYDROCHLORIDE 15 MG: 10 TABLET ORAL at 14:25

## 2024-04-06 RX ADMIN — ASPIRIN 325 MG: 325 TABLET, COATED ORAL at 08:37

## 2024-04-06 RX ADMIN — SODIUM CHLORIDE: 9 INJECTION, SOLUTION INTRAVENOUS at 17:50

## 2024-04-06 RX ADMIN — WATER 1000 MG: 1 INJECTION INTRAMUSCULAR; INTRAVENOUS; SUBCUTANEOUS at 02:01

## 2024-04-06 RX ADMIN — BUSPIRONE HYDROCHLORIDE 15 MG: 10 TABLET ORAL at 08:37

## 2024-04-06 ASSESSMENT — PAIN DESCRIPTION - ONSET: ONSET: GRADUAL

## 2024-04-06 ASSESSMENT — PAIN DESCRIPTION - LOCATION: LOCATION: HIP

## 2024-04-06 ASSESSMENT — PAIN SCALES - GENERAL
PAINLEVEL_OUTOF10: 6
PAINLEVEL_OUTOF10: 3
PAINLEVEL_OUTOF10: 0

## 2024-04-06 ASSESSMENT — PAIN DESCRIPTION - ORIENTATION: ORIENTATION: RIGHT

## 2024-04-06 NOTE — PLAN OF CARE
Problem: Discharge Planning  Goal: Discharge to home or other facility with appropriate resources  Outcome: Progressing     Problem: Skin/Tissue Integrity  Goal: Absence of new skin breakdown  Description: 1.  Monitor for areas of redness and/or skin breakdown  2.  Assess vascular access sites hourly  3.  Every 4-6 hours minimum:  Change oxygen saturation probe site  4.  Every 4-6 hours:  If on nasal continuous positive airway pressure, respiratory therapy assess nares and determine need for appliance change or resting period.  Outcome: Progressing     Problem: Safety - Adult  Goal: Free from fall injury  Outcome: Progressing  Flowsheets (Taken 4/5/2024 1442 by Renny Fuentes, RN)  Free From Fall Injury: Instruct family/caregiver on patient safety     Problem: ABCDS Injury Assessment  Goal: Absence of physical injury  Outcome: Progressing     Problem: Pain  Goal: Verbalizes/displays adequate comfort level or baseline comfort level  Outcome: Progressing

## 2024-04-07 ENCOUNTER — APPOINTMENT (OUTPATIENT)
Dept: GENERAL RADIOLOGY | Age: 84
DRG: 522 | End: 2024-04-07
Attending: FAMILY MEDICINE
Payer: MEDICARE

## 2024-04-07 LAB
ANION GAP SERPL CALC-SCNC: 2 MMOL/L (ref 2–11)
APPEARANCE UR: CLEAR
BACTERIA URNS QL MICRO: NEGATIVE /HPF
BASOPHILS # BLD: 0 K/UL (ref 0–0.2)
BASOPHILS NFR BLD: 0 % (ref 0–2)
BILIRUB UR QL: NEGATIVE
BUN SERPL-MCNC: 16 MG/DL (ref 8–23)
CALCIUM SERPL-MCNC: 8.3 MG/DL (ref 8.3–10.4)
CASTS URNS QL MICRO: ABNORMAL /LPF
CHLORIDE SERPL-SCNC: 112 MMOL/L (ref 103–113)
CO2 SERPL-SCNC: 24 MMOL/L (ref 21–32)
COLOR UR: ABNORMAL
CREAT SERPL-MCNC: 0.9 MG/DL (ref 0.6–1)
DIFFERENTIAL METHOD BLD: ABNORMAL
EOSINOPHIL # BLD: 0.2 K/UL (ref 0–0.8)
EOSINOPHIL NFR BLD: 1 % (ref 0.5–7.8)
EPI CELLS #/AREA URNS HPF: ABNORMAL /HPF
ERYTHROCYTE [DISTWIDTH] IN BLOOD BY AUTOMATED COUNT: 12.5 % (ref 11.9–14.6)
GLUCOSE SERPL-MCNC: 103 MG/DL (ref 65–100)
GLUCOSE UR STRIP.AUTO-MCNC: NEGATIVE MG/DL
HCT VFR BLD AUTO: 33.8 % (ref 35.8–46.3)
HGB BLD-MCNC: 11.3 G/DL (ref 11.7–15.4)
HGB UR QL STRIP: ABNORMAL
IMM GRANULOCYTES # BLD AUTO: 0.2 K/UL (ref 0–0.5)
IMM GRANULOCYTES NFR BLD AUTO: 1 % (ref 0–5)
KETONES UR QL STRIP.AUTO: NEGATIVE MG/DL
LEUKOCYTE ESTERASE UR QL STRIP.AUTO: ABNORMAL
LYMPHOCYTES # BLD: 0.9 K/UL (ref 0.5–4.6)
LYMPHOCYTES NFR BLD: 5 % (ref 13–44)
MCH RBC QN AUTO: 30.9 PG (ref 26.1–32.9)
MCHC RBC AUTO-ENTMCNC: 33.4 G/DL (ref 31.4–35)
MCV RBC AUTO: 92.3 FL (ref 82–102)
MM INDURATION, POC: 0 MM (ref 0–5)
MONOCYTES # BLD: 2.3 K/UL (ref 0.1–1.3)
MONOCYTES NFR BLD: 13 % (ref 4–12)
MUCOUS THREADS URNS QL MICRO: 0 /LPF
NEUTS SEG # BLD: 13.7 K/UL (ref 1.7–8.2)
NEUTS SEG NFR BLD: 79 % (ref 43–78)
NITRITE UR QL STRIP.AUTO: NEGATIVE
NRBC # BLD: 0 K/UL (ref 0–0.2)
PH UR STRIP: 5.5 (ref 5–9)
PLATELET # BLD AUTO: 193 K/UL (ref 150–450)
PMV BLD AUTO: 12.5 FL (ref 9.4–12.3)
POTASSIUM SERPL-SCNC: 3.9 MMOL/L (ref 3.5–5.1)
PPD, POC: NEGATIVE
PROCALCITONIN SERPL-MCNC: 0.24 NG/ML (ref 0–0.49)
PROT UR STRIP-MCNC: NEGATIVE MG/DL
RBC # BLD AUTO: 3.66 M/UL (ref 4.05–5.2)
RBC #/AREA URNS HPF: ABNORMAL /HPF
SODIUM SERPL-SCNC: 138 MMOL/L (ref 136–146)
SP GR UR REFRACTOMETRY: 1.01 (ref 1–1.02)
URINE CULTURE IF INDICATED: ABNORMAL
UROBILINOGEN UR QL STRIP.AUTO: 0.2 EU/DL (ref 0.2–1)
WBC # BLD AUTO: 17.8 K/UL (ref 4.3–11.1)
WBC URNS QL MICRO: ABNORMAL /HPF

## 2024-04-07 PROCEDURE — 2580000003 HC RX 258: Performed by: ORTHOPAEDIC SURGERY

## 2024-04-07 PROCEDURE — 87086 URINE CULTURE/COLONY COUNT: CPT

## 2024-04-07 PROCEDURE — 36415 COLL VENOUS BLD VENIPUNCTURE: CPT

## 2024-04-07 PROCEDURE — 81001 URINALYSIS AUTO W/SCOPE: CPT

## 2024-04-07 PROCEDURE — 6360000002 HC RX W HCPCS: Performed by: STUDENT IN AN ORGANIZED HEALTH CARE EDUCATION/TRAINING PROGRAM

## 2024-04-07 PROCEDURE — 71045 X-RAY EXAM CHEST 1 VIEW: CPT

## 2024-04-07 PROCEDURE — 97530 THERAPEUTIC ACTIVITIES: CPT

## 2024-04-07 PROCEDURE — 1100000000 HC RM PRIVATE

## 2024-04-07 PROCEDURE — 2580000003 HC RX 258: Performed by: FAMILY MEDICINE

## 2024-04-07 PROCEDURE — 2580000003 HC RX 258: Performed by: STUDENT IN AN ORGANIZED HEALTH CARE EDUCATION/TRAINING PROGRAM

## 2024-04-07 PROCEDURE — 6370000000 HC RX 637 (ALT 250 FOR IP): Performed by: FAMILY MEDICINE

## 2024-04-07 PROCEDURE — 80048 BASIC METABOLIC PNL TOTAL CA: CPT

## 2024-04-07 PROCEDURE — 6370000000 HC RX 637 (ALT 250 FOR IP): Performed by: ORTHOPAEDIC SURGERY

## 2024-04-07 PROCEDURE — 84145 PROCALCITONIN (PCT): CPT

## 2024-04-07 PROCEDURE — 85027 COMPLETE CBC AUTOMATED: CPT

## 2024-04-07 RX ADMIN — SODIUM CHLORIDE, PRESERVATIVE FREE 10 ML: 5 INJECTION INTRAVENOUS at 21:02

## 2024-04-07 RX ADMIN — SODIUM CHLORIDE, PRESERVATIVE FREE 10 ML: 5 INJECTION INTRAVENOUS at 10:48

## 2024-04-07 RX ADMIN — WATER 1000 MG: 1 INJECTION INTRAMUSCULAR; INTRAVENOUS; SUBCUTANEOUS at 16:40

## 2024-04-07 RX ADMIN — LOSARTAN POTASSIUM 50 MG: 50 TABLET, FILM COATED ORAL at 10:47

## 2024-04-07 RX ADMIN — BUSPIRONE HYDROCHLORIDE 15 MG: 10 TABLET ORAL at 21:01

## 2024-04-07 RX ADMIN — SODIUM CHLORIDE: 9 INJECTION, SOLUTION INTRAVENOUS at 01:59

## 2024-04-07 RX ADMIN — METOPROLOL SUCCINATE 50 MG: 50 TABLET, FILM COATED, EXTENDED RELEASE ORAL at 10:46

## 2024-04-07 RX ADMIN — LEVOTHYROXINE SODIUM 50 MCG: 0.05 TABLET ORAL at 06:04

## 2024-04-07 RX ADMIN — BUSPIRONE HYDROCHLORIDE 15 MG: 10 TABLET ORAL at 10:45

## 2024-04-07 RX ADMIN — VITAMIN D, TAB 1000IU (100/BT) 1000 UNITS: 25 TAB at 10:47

## 2024-04-07 RX ADMIN — ASPIRIN 325 MG: 325 TABLET, COATED ORAL at 10:45

## 2024-04-07 RX ADMIN — BUSPIRONE HYDROCHLORIDE 15 MG: 10 TABLET ORAL at 14:48

## 2024-04-07 ASSESSMENT — PAIN SCALES - GENERAL
PAINLEVEL_OUTOF10: 0
PAINLEVEL_OUTOF10: 0

## 2024-04-07 ASSESSMENT — PAIN SCALES - WONG BAKER: WONGBAKER_NUMERICALRESPONSE: NO HURT

## 2024-04-08 LAB
BASOPHILS # BLD: 0.1 K/UL (ref 0–0.2)
BASOPHILS NFR BLD: 0 % (ref 0–2)
DIFFERENTIAL METHOD BLD: ABNORMAL
EOSINOPHIL # BLD: 0.4 K/UL (ref 0–0.8)
EOSINOPHIL NFR BLD: 2 % (ref 0.5–7.8)
ERYTHROCYTE [DISTWIDTH] IN BLOOD BY AUTOMATED COUNT: 12.7 % (ref 11.9–14.6)
HCT VFR BLD AUTO: 33.1 % (ref 35.8–46.3)
HGB BLD-MCNC: 11.2 G/DL (ref 11.7–15.4)
IMM GRANULOCYTES # BLD AUTO: 0.2 K/UL (ref 0–0.5)
IMM GRANULOCYTES NFR BLD AUTO: 1 % (ref 0–5)
LYMPHOCYTES # BLD: 1.1 K/UL (ref 0.5–4.6)
LYMPHOCYTES NFR BLD: 6 % (ref 13–44)
MCH RBC QN AUTO: 30.8 PG (ref 26.1–32.9)
MCHC RBC AUTO-ENTMCNC: 33.8 G/DL (ref 31.4–35)
MCV RBC AUTO: 90.9 FL (ref 82–102)
MONOCYTES # BLD: 2.1 K/UL (ref 0.1–1.3)
MONOCYTES NFR BLD: 12 % (ref 4–12)
NEUTS SEG # BLD: 14.6 K/UL (ref 1.7–8.2)
NEUTS SEG NFR BLD: 79 % (ref 43–78)
NRBC # BLD: 0 K/UL (ref 0–0.2)
PLATELET # BLD AUTO: 224 K/UL (ref 150–450)
PMV BLD AUTO: 12.3 FL (ref 9.4–12.3)
RBC # BLD AUTO: 3.64 M/UL (ref 4.05–5.2)
WBC # BLD AUTO: 18.4 K/UL (ref 4.3–11.1)

## 2024-04-08 PROCEDURE — 85025 COMPLETE CBC W/AUTO DIFF WBC: CPT

## 2024-04-08 PROCEDURE — 36415 COLL VENOUS BLD VENIPUNCTURE: CPT

## 2024-04-08 PROCEDURE — 2580000003 HC RX 258: Performed by: ORTHOPAEDIC SURGERY

## 2024-04-08 PROCEDURE — 1100000000 HC RM PRIVATE

## 2024-04-08 PROCEDURE — 2580000003 HC RX 258: Performed by: FAMILY MEDICINE

## 2024-04-08 PROCEDURE — 6370000000 HC RX 637 (ALT 250 FOR IP): Performed by: ORTHOPAEDIC SURGERY

## 2024-04-08 PROCEDURE — 6370000000 HC RX 637 (ALT 250 FOR IP): Performed by: FAMILY MEDICINE

## 2024-04-08 PROCEDURE — 97530 THERAPEUTIC ACTIVITIES: CPT

## 2024-04-08 PROCEDURE — 97535 SELF CARE MNGMENT TRAINING: CPT

## 2024-04-08 PROCEDURE — 6360000002 HC RX W HCPCS: Performed by: STUDENT IN AN ORGANIZED HEALTH CARE EDUCATION/TRAINING PROGRAM

## 2024-04-08 PROCEDURE — 2580000003 HC RX 258: Performed by: STUDENT IN AN ORGANIZED HEALTH CARE EDUCATION/TRAINING PROGRAM

## 2024-04-08 PROCEDURE — 6360000002 HC RX W HCPCS: Performed by: FAMILY MEDICINE

## 2024-04-08 RX ADMIN — METOPROLOL SUCCINATE 50 MG: 50 TABLET, FILM COATED, EXTENDED RELEASE ORAL at 08:37

## 2024-04-08 RX ADMIN — ASPIRIN 325 MG: 325 TABLET, COATED ORAL at 08:37

## 2024-04-08 RX ADMIN — ONDANSETRON 4 MG: 2 INJECTION INTRAMUSCULAR; INTRAVENOUS at 05:37

## 2024-04-08 RX ADMIN — SODIUM CHLORIDE, PRESERVATIVE FREE 5 ML: 5 INJECTION INTRAVENOUS at 21:09

## 2024-04-08 RX ADMIN — VITAMIN D, TAB 1000IU (100/BT) 1000 UNITS: 25 TAB at 08:37

## 2024-04-08 RX ADMIN — WATER 1000 MG: 1 INJECTION INTRAMUSCULAR; INTRAVENOUS; SUBCUTANEOUS at 16:43

## 2024-04-08 RX ADMIN — BUSPIRONE HYDROCHLORIDE 15 MG: 10 TABLET ORAL at 13:32

## 2024-04-08 RX ADMIN — LEVOTHYROXINE SODIUM 50 MCG: 0.05 TABLET ORAL at 05:37

## 2024-04-08 RX ADMIN — BUSPIRONE HYDROCHLORIDE 15 MG: 10 TABLET ORAL at 21:09

## 2024-04-08 RX ADMIN — SODIUM CHLORIDE, PRESERVATIVE FREE 5 ML: 5 INJECTION INTRAVENOUS at 08:38

## 2024-04-08 RX ADMIN — BUSPIRONE HYDROCHLORIDE 15 MG: 10 TABLET ORAL at 08:37

## 2024-04-08 ASSESSMENT — PAIN SCALES - GENERAL
PAINLEVEL_OUTOF10: 0
PAINLEVEL_OUTOF10: 0

## 2024-04-08 ASSESSMENT — PAIN SCALES - WONG BAKER: WONGBAKER_NUMERICALRESPONSE: NO HURT

## 2024-04-08 NOTE — DISCHARGE INSTRUCTIONS
[facility]      IF YOU HAVE ANY PROBLEMS ONCE YOU ARE AT HOME CALL THE FOLLOWING NUMBERS:   Main office number: [phone] ask for Leonie (medical assistant with Dr. Galvez)  Office Address: [address] [place] [address]  [address] [place] [address]      Patient Discharge Instructions    Heather Osei / [id number] : 1940    Admitted 2024           To be given to Skilled Nursing Home on Admission         Weight bearing status: As tolerated with walker and assistance    Activity  Continue Physical Therapy and Occupational Therapy   Fall precautions     Wound Care   Staples are to be left in place and removed in our office  and Leave dressing in place, call if saturated     Diet  Resume regular or diabetic diet      Medications    Patient medications are listed on the medication reconciliation sheet.     Follow up   Follow up in our office in 2 weeks postop   All patients are to be transported via stretcher unless they are able to independently get out of a chair and stand without assistance.               Information obtained by :  I understand that if any problems occur once I am at home I am to contact my physician.    I understand and acknowledge receipt of the instructions indicated above.                                                                                                                                           Physician's or R.N.'s Signature                                                                  Date/Time                                                                                                                                              Patient or Representative Signature                                                          Date/Time

## 2024-04-08 NOTE — PLAN OF CARE
Problem: Discharge Planning  Goal: Discharge to home or other facility with appropriate resources  4/7/2024 2026 by Madelin Briggs RN  Outcome: Progressing  4/7/2024 1302 by Miguel Portillo RN  Outcome: Progressing     Problem: Skin/Tissue Integrity  Goal: Absence of new skin breakdown  Description: 1.  Monitor for areas of redness and/or skin breakdown  2.  Assess vascular access sites hourly  3.  Every 4-6 hours minimum:  Change oxygen saturation probe site  4.  Every 4-6 hours:  If on nasal continuous positive airway pressure, respiratory therapy assess nares and determine need for appliance change or resting period.  4/7/2024 2026 by Madelin Briggs RN  Outcome: Progressing  4/7/2024 1302 by Miguel Portillo RN  Outcome: Progressing     Problem: Safety - Adult  Goal: Free from fall injury  4/7/2024 2026 by Madelin Briggs RN  Outcome: Progressing  4/7/2024 1302 by Miguel Portillo RN  Outcome: Progressing     Problem: ABCDS Injury Assessment  Goal: Absence of physical injury  4/7/2024 2026 by Madelin Briggs RN  Outcome: Progressing  4/7/2024 1302 by Miguel Portillo RN  Outcome: Progressing     Problem: Pain  Goal: Verbalizes/displays adequate comfort level or baseline comfort level  4/7/2024 2026 by Madelin Briggs RN  Outcome: Progressing  4/7/2024 1302 by Miguel Portillo RN  Outcome: Progressing

## 2024-04-09 VITALS
TEMPERATURE: 97.7 F | BODY MASS INDEX: 20.86 KG/M2 | SYSTOLIC BLOOD PRESSURE: 153 MMHG | WEIGHT: 122.2 LBS | HEIGHT: 64 IN | RESPIRATION RATE: 19 BRPM | HEART RATE: 80 BPM | DIASTOLIC BLOOD PRESSURE: 81 MMHG | OXYGEN SATURATION: 94 %

## 2024-04-09 PROBLEM — D62 ACUTE BLOOD LOSS AS CAUSE OF POSTOPERATIVE ANEMIA: Status: RESOLVED | Noted: 2024-04-06 | Resolved: 2024-04-09

## 2024-04-09 LAB
BACTERIA SPEC CULT: NORMAL
BASOPHILS # BLD: 0.1 K/UL (ref 0–0.2)
BASOPHILS NFR BLD: 0 % (ref 0–2)
DIFFERENTIAL METHOD BLD: ABNORMAL
EOSINOPHIL # BLD: 0.8 K/UL (ref 0–0.8)
EOSINOPHIL NFR BLD: 5 % (ref 0.5–7.8)
ERYTHROCYTE [DISTWIDTH] IN BLOOD BY AUTOMATED COUNT: 12.7 % (ref 11.9–14.6)
HCT VFR BLD AUTO: 30.2 % (ref 35.8–46.3)
HGB BLD-MCNC: 10.5 G/DL (ref 11.7–15.4)
IMM GRANULOCYTES # BLD AUTO: 0.2 K/UL (ref 0–0.5)
IMM GRANULOCYTES NFR BLD AUTO: 1 % (ref 0–5)
LYMPHOCYTES # BLD: 1.2 K/UL (ref 0.5–4.6)
LYMPHOCYTES NFR BLD: 8 % (ref 13–44)
MCH RBC QN AUTO: 31.6 PG (ref 26.1–32.9)
MCHC RBC AUTO-ENTMCNC: 34.8 G/DL (ref 31.4–35)
MCV RBC AUTO: 91 FL (ref 82–102)
MONOCYTES # BLD: 1.9 K/UL (ref 0.1–1.3)
MONOCYTES NFR BLD: 13 % (ref 4–12)
NEUTS SEG # BLD: 11 K/UL (ref 1.7–8.2)
NEUTS SEG NFR BLD: 73 % (ref 43–78)
NRBC # BLD: 0 K/UL (ref 0–0.2)
PLATELET # BLD AUTO: 214 K/UL (ref 150–450)
PMV BLD AUTO: 12.1 FL (ref 9.4–12.3)
RBC # BLD AUTO: 3.32 M/UL (ref 4.05–5.2)
SARS-COV-2 RDRP RESP QL NAA+PROBE: NOT DETECTED
SERVICE CMNT-IMP: NORMAL
SOURCE: NORMAL
WBC # BLD AUTO: 15.1 K/UL (ref 4.3–11.1)

## 2024-04-09 PROCEDURE — 6370000000 HC RX 637 (ALT 250 FOR IP): Performed by: FAMILY MEDICINE

## 2024-04-09 PROCEDURE — 6370000000 HC RX 637 (ALT 250 FOR IP): Performed by: ORTHOPAEDIC SURGERY

## 2024-04-09 PROCEDURE — 97530 THERAPEUTIC ACTIVITIES: CPT

## 2024-04-09 PROCEDURE — 87635 SARS-COV-2 COVID-19 AMP PRB: CPT

## 2024-04-09 PROCEDURE — 36415 COLL VENOUS BLD VENIPUNCTURE: CPT

## 2024-04-09 PROCEDURE — 85025 COMPLETE CBC W/AUTO DIFF WBC: CPT

## 2024-04-09 PROCEDURE — 2580000003 HC RX 258: Performed by: ORTHOPAEDIC SURGERY

## 2024-04-09 RX ORDER — TRAMADOL HYDROCHLORIDE 50 MG/1
25 TABLET ORAL EVERY 8 HOURS PRN
Qty: 5 TABLET | Refills: 0 | Status: SHIPPED | OUTPATIENT
Start: 2024-04-09 | End: 2024-04-12

## 2024-04-09 RX ORDER — CEFPODOXIME PROXETIL 100 MG/1
100 TABLET, FILM COATED ORAL 2 TIMES DAILY
Qty: 2 TABLET | Refills: 0 | Status: SHIPPED | OUTPATIENT
Start: 2024-04-09 | End: 2024-04-10

## 2024-04-09 RX ORDER — ASPIRIN 325 MG
325 TABLET, DELAYED RELEASE (ENTERIC COATED) ORAL DAILY
Qty: 30 TABLET | Refills: 3
Start: 2024-04-09

## 2024-04-09 RX ORDER — LANOLIN ALCOHOL/MO/W.PET/CERES
3 CREAM (GRAM) TOPICAL NIGHTLY PRN
Qty: 30 TABLET | Refills: 0
Start: 2024-04-09

## 2024-04-09 RX ADMIN — VITAMIN D, TAB 1000IU (100/BT) 1000 UNITS: 25 TAB at 09:16

## 2024-04-09 RX ADMIN — ONDANSETRON 4 MG: 4 TABLET, ORALLY DISINTEGRATING ORAL at 09:16

## 2024-04-09 RX ADMIN — SODIUM CHLORIDE, PRESERVATIVE FREE 10 ML: 5 INJECTION INTRAVENOUS at 08:00

## 2024-04-09 RX ADMIN — LEVOTHYROXINE SODIUM 50 MCG: 0.05 TABLET ORAL at 05:34

## 2024-04-09 RX ADMIN — ASPIRIN 325 MG: 325 TABLET, COATED ORAL at 09:16

## 2024-04-09 RX ADMIN — LOSARTAN POTASSIUM 50 MG: 50 TABLET, FILM COATED ORAL at 09:16

## 2024-04-09 RX ADMIN — METOPROLOL SUCCINATE 50 MG: 50 TABLET, FILM COATED, EXTENDED RELEASE ORAL at 09:13

## 2024-04-09 RX ADMIN — BUSPIRONE HYDROCHLORIDE 15 MG: 10 TABLET ORAL at 09:14

## 2024-04-09 NOTE — DISCHARGE SUMMARY
No m/r/g.  No JVD  Lungs:   CTAB.  No wheezing, rhonchi, or rales.  Respirations even, unlabored  Abdomen:   Soft, nontender, nondistended.    Extremities: Warm and dry.   No edema.  R hip drsg CDI  Skin:     No rashes.  Normal coloration  Neuro:  CN II-XII grossly intact.  Psych:  Normal mood and affect.    Signed:  TEN ONEIL

## 2024-04-09 NOTE — CARE COORDINATION
CM reviewed pt chart for continued stay.  PT/OT has recommended pt for SNF rehab.  SNF list was provided.  Chose referrals to [facility] and [facility] with preference in that order.  Referrals sent as requested.  Await for facilities to review referral in am for any bed offers.  Will continue to follow pt plan of care and assist with any supportive care needs that arise as appropriate.     
Pt is for discharge today to [facility] as planned.  Transport via [facility] around 1300.  Packet prepared to go with pt to facility.  Awaiting Room number and report number from facility. Pt aware and is in agreement to discharge today.    Update 0930: Pt will go to room 218B, report number [id number] unit 2.  Primary nurse made aware.    04/09/24 0910   Services At/After Discharge   Transition of Care Consult (CM Consult) SNF;Transportation Assistance   Partner SNF Yes   Services At/After Discharge Skilled Nursing Facility (SNF);In ambulance    Resource Information Provided? No   Mode of Transport at Discharge [facility] Transport Time of Discharge 1300   Confirm Follow Up Transport Self   Condition of Participation: Discharge Planning   The Plan for Transition of Care is related to the following treatment goals: To STR to facilitate return to baseline function   The Patient and/or Patient Representative was provided with a Choice of Provider? Patient   The Patient and/Or Patient Representative agree with the Discharge Plan? Yes       
Spoke with Esther Skelton NP. Pt should be medically ready for DC tomorrow. [facility] and [facility] made aware. Awaiting to see if [facility] or [facility] will have a bed for tomorrow. [facility] has declined pt.  
  Patient expects to be discharged to: Unknown   One/Two Story Residence One story   Services At/After Discharge   Transition of Care Consult (CM Consult) Discharge Planning   [place] Resource Information Provided? No   Confirm Follow Up Transport Family   Condition of Participation: Discharge Planning   The Plan for Transition of Care is related to the following treatment goals: Based on clinical course   The Patient and/or Patient Representative was provided with a Choice of Provider? Patient   Freedom of Choice list was provided with basic dialogue that supports the patient's individualized plan of care/goals, treatment preferences, and shares the quality data associated with the providers?  Yes

## 2024-04-09 NOTE — PLAN OF CARE
Problem: Discharge Planning  Goal: Discharge to home or other facility with appropriate resources  4/8/2024 2203 by Megan Noe RN  Outcome: Progressing  Flowsheets (Taken 4/8/2024 1936)  Discharge to home or other facility with appropriate resources: Identify barriers to discharge with patient and caregiver  4/8/2024 1055 by Narda Arriaza RN  Outcome: Progressing     Problem: Skin/Tissue Integrity  Goal: Absence of new skin breakdown  Description: 1.  Monitor for areas of redness and/or skin breakdown  2.  Assess vascular access sites hourly  3.  Every 4-6 hours minimum:  Change oxygen saturation probe site  4.  Every 4-6 hours:  If on nasal continuous positive airway pressure, respiratory therapy assess nares and determine need for appliance change or resting period.  4/8/2024 2203 by Megan Noe RN  Outcome: Progressing  4/8/2024 1055 by Narda Arriaza RN  Outcome: Progressing     Problem: Safety - Adult  Goal: Free from fall injury  4/8/2024 2203 by Megan Noe RN  Outcome: Progressing  4/8/2024 1055 by Narda Arriaza RN  Outcome: Progressing     Problem: ABCDS Injury Assessment  Goal: Absence of physical injury  4/8/2024 2203 by Megan Noe RN  Outcome: Progressing  4/8/2024 1055 by Narda Arriaza RN  Outcome: Progressing     Problem: Pain  Goal: Verbalizes/displays adequate comfort level or baseline comfort level  4/8/2024 2203 by Megan Noe RN  Outcome: Progressing  4/8/2024 1055 by Narda Arriaza RN  Outcome: Progressing     Problem: Neurosensory - Adult  Goal: Achieves stable or improved neurological status  Outcome: Progressing  Flowsheets (Taken 4/8/2024 1936)  Achieves stable or improved neurological status: Assess for and report changes in neurological status     Problem: Respiratory - Adult  Goal: Achieves optimal ventilation and oxygenation  Outcome: Progressing  Flowsheets (Taken 4/8/2024 1936)  Achieves optimal ventilation and oxygenation: Assess for changes in

## 2024-04-10 ENCOUNTER — CARE COORDINATION (OUTPATIENT)
Dept: CARE COORDINATION | Age: 84
End: 2024-04-10

## 2024-04-10 NOTE — PROGRESS NOTES
Hospitalist Progress Note   Admit Date:  2024  6:50 PM   Name:  Heather Osei   Age:  83 y.o.  Sex:  female  :  1940   MRN:  [id number]   Room:  MOR/PL    Presenting/Chief Complaint: No chief complaint on file.     Reason(s) for Admission: Hip fracture requiring operative repair, right, closed, initial encounter ([facility]) [S72.001A]     Hospital Course:   Heather Osei is a 83 y.o. female with medical history of osteoporosis, hypothyroidism, CKD 3, HTN who presented with fall, found to have hip Fx.     Heather was transferred from [facility] to [facility] after while going to lunch with her friend, she missed a step leading to her falling on the right hip. She had immediate pain in the right hip thereafter. She did not hit her head. She did not lose consciousness. She denies having pain elsewhere. She is not on any anticoagulation. She had no precipitating chest pain, palpitations, shortness of breath, visual changes, focal weakness, sensory or coordination changes. She presented to the ER via EMS as she was unable to walk and had right hip pain.     In the ER, vital signs stable.  Labs were unremarkable except for a CO2 of 10 with normal lactic acid.  CBC and BMP otherwise within normal limits.  Plain films of the right hip with acute fracture of the right femoral neck with mild comminution.  The orthopedics team was consulted at [facility] and were agreeable to see the patient in consultation here.  Medicine team was contacted here to accept transfer.     Upon arrival she was seen at the bedside.  She stated she felt well other than some mild right hip pain.  She actually has no complaints at this time.  Vital signs continue to remain stable.  A BMP was rechecked with resolution of the prior reduced CO2 with no significant interventions.      Subjective & 24hr Events:   The patient has hip pain with movement. She is comfortable at rest. No fevers, chills, nausea, vomiting, 
       Hospitalist Progress Note   Admit Date:  2024  6:50 PM   Name:  Heather Osei   Age:  83 y.o.  Sex:  female  :  1940   MRN:  [id number]   Room:  411/    Presenting/Chief Complaint: No chief complaint on file.     Reason(s) for Admission: Hip fracture requiring operative repair, right, closed, initial encounter ([facility]) [S72.001A]     Hospital Course:   Patient is an 82 y/o female with medical history of osteoporosis, hypothyroidism, CKD III, hypertension who presented s/p mechanical fall when she missed a step and landed on her R hip. She felt immediate pain in R hip after the fall and was unable to ambulate/bear weight. No head impact or loss of consciousness. No precipitating chest pain, palpitations, shortness of breath, visual changes, focal weakness, sensory or coordination changes. Hemodynamics stable on arrival. Labs unremarkable. Imaging revealed an acute right femoral neck fracture. Hospitalist admitted. Orthopedics consulted. Patient underwent right hemiarthroplasty with Dr. Nielsen .     Subjective & 24hr Events:   Pending therapy evaluations - anticipate rehab will be recommended.  Daily labs reviewed.  Surgical Aquacel dressing C/D/I - no drainage or hematoma   Patient alert, oriented, sitting upright in chair. She is eating lunch. She reports good participation with therapy. Pain currently controlled. No chest pain, shortness of breath, nausea, vomiting.  No events on telemetry, remains in NSR - okay to discontinue.    Assessment & Plan:     Hip fracture requiring operative repair, right, closed, initial encounter ([facility])  -s/p right hemiarthroplasty with Dr. Nielsen   -PT/OT/PPD  -DVT ppx:  mg po daily  -Vitamin D 1000 units po daily  -Analgesia: as needed Tramadol, Oxycodone, or Hydromorphone    Acute post-operative blood loss anemia  -Hgb 11.6 POD #1  -Monitor daily, dressing is C/D/I without drainage or visible hematoma    Acquired hypothyroidism  -Levothyroxine 50 
       Hospitalist Progress Note   Admit Date:  2024  6:50 PM   Name:  Heather Osei   Age:  83 y.o.  Sex:  female  :  1940   MRN:  [id number]   Room:  411/01    Presenting/Chief Complaint: No chief complaint on file.     Reason(s) for Admission: Hip fracture requiring operative repair, right, closed, initial encounter ([facility]) [S72.001A]     Hospital Course:   Patient is an 82 y/o female with medical history of osteoporosis, hypothyroidism, CKD III, hypertension who presented s/p mechanical fall when she missed a step and landed on her R hip. She felt immediate pain in R hip after the fall and was unable to ambulate/bear weight. No head impact or loss of consciousness. No precipitating chest pain, palpitations, shortness of breath, visual changes, focal weakness, sensory or coordination changes. Hemodynamics stable on arrival. Labs unremarkable. Imaging revealed an acute right femoral neck fracture. Hospitalist admitted. Orthopedics consulted. Patient underwent right hemiarthroplasty with Dr. Nielsen . Therapy evaluations with rehab recommendations. Pending placement, anticipate rehab bed available .    WBC not checked on arrival. Was obtained pre-operatively  and noted to be elevated at 16.4. Patient remains afebrile, procal 0.24, CXR negative for infiltrates/consolidation. Urinalysis c/w infection, reflexed to culture. Initiated and Ceftriaxone and awaiting culture. Patient overall feels well and continues to improve with therapy. She is eating and voiding well with no acute complaints.    Subjective & 24hr Events:   Patient alert, oriented, upright in bed. Pain currently controlled. No chest pain, shortness of breath, cough, congestion. No abdominal pain, nausea, vomiting, diarrhea, dysuria.  Eating and drinking well although oral intake at baseline is fairly poor.    Evaluated again ambulating in halls with therapy. Doing quite well. Eager to go to rehab and progress.    Assessment & Plan: 
  [facility]        2024         Post Op day: 1 Day Post-Op Procedure(s) (LRB):  BIPOLAR HIP WITH DEPUY (Right)      Admit Date: 2024  Admit Diagnosis: Hip fracture requiring operative repair, right, closed, initial encounter ([facility]) [S72.001A]       Principle Problem: Hip fracture requiring operative repair, right, closed, initial encounter ([facility]).           Subjective: Doing well, No complaints, No SOB, No Chest Pain, No Nausea or Vomiting     Objective:   Vital Signs are Stable, No Acute Distress, Alert,  Dressing is Dry,    Neurovascular check:Moves toes well.  Abdn pillow on and leg in good orientation     Assessment / Plan :  Patient Active Problem List   Diagnosis    Bronchiectasis with acute exacerbation ([facility])    Osteoporosis    Acquired hypothyroidism    Essential hypertension    Mammogram declined    Hx of colonoscopy    Medicare annual wellness visit, subsequent    Primary insomnia    Advanced care planning/counseling discussion    Moderate single current episode of major depressive disorder ([facility])    Anxiety    Chronic renal disease, stage III ([facility]) [408578]    Hip fracture requiring operative repair, right, closed, initial encounter ([facility])    Patient Vitals for the past 8 hrs:   BP Temp Temp src Pulse Resp SpO2   24 0745 (!) 154/84 98.4 °F (36.9 °C) Oral (!) 104 17 95 %    Temp (24hrs), Av.1 °F (36.7 °C), Min:97.3 °F (36.3 °C), Max:98.8 °F (37.1 °C)    Body mass index is 17.85 kg/m².    Lab Results   Component Value Date/Time    HGB 11.6 2024 05:07 AM          S/P Procedure(s) (LRB):  BIPOLAR HIP WITH DEPUY (Right)        Medical Mgmt per hospitalist  Anticoagulation plan: Aspirin 325 mg daily for 30 days  Continue PT, WBAT   Dressing change:As needed only  Fall Precautions  DC disp: SNF  F/U: 2 weeks postop for wound check and x rays       Signed By: Bismark Nielsen MD  2024,  11:52 AM      
  [facility]        2024         Post Op day: 3 Days Post-Op Procedure(s) (LRB):  BIPOLAR HIP WITH DEPUY (Right)      Admit Date: 2024  Admit Diagnosis: Hip fracture requiring operative repair, right, closed, initial encounter ([facility]) [S72.001A]       Principle Problem: Hip fracture requiring operative repair, right, closed, initial encounter ([facility]).           Subjective: not feeling well, lightheaded. No SOB, No Chest Pain, No Nausea or Vomiting     Objective:   Vital Signs are Stable, No Acute Distress, Alert,  Dressing is Dry,    Neurovascular check:intact     Assessment / Plan :  Patient Active Problem List   Diagnosis    Bronchiectasis with acute exacerbation (HCC)    Osteoporosis    Acquired hypothyroidism    Essential hypertension    Mammogram declined    Hx of colonoscopy    Medicare annual wellness visit, subsequent    Primary insomnia    Advanced care planning/counseling discussion    Moderate single current episode of major depressive disorder ([facility])    Anxiety    Chronic renal disease, stage III ([facility]) [523728]    Hip fracture requiring operative repair, right, closed, initial encounter ([facility])    Leukocytosis    Acute blood loss as cause of postoperative anemia    Patient Vitals for the past 8 hrs:   BP Temp Temp src Pulse Resp SpO2 Weight   24 0830 122/60 -- -- -- -- -- --   24 0713 (!) 156/131 98.4 °F (36.9 °C) Oral 75 16 96 % --   24 0645 -- -- -- -- -- -- 49.2 kg (108 lb 7.5 oz)    Temp (24hrs), Av.1 °F (36.7 °C), Min:97.7 °F (36.5 °C), Max:98.4 °F (36.9 °C)    Body mass index is 18.62 kg/m².    Lab Results   Component Value Date/Time    HGB 11.2 2024 05:37 AM          S/P Procedure(s) (LRB):  BIPOLAR HIP WITH DEPUY (Right)    Leucocytosis and abnormal U/A,,on ceftriaxone. Probably not from hip    Medical Mgmt per hospitalist  Anticoagulation plan: Aspirin 325 mg daily for 30 days  Continue PT, WBAT   Dressing change:As needed only  Fall Precautions  DC 
  [facility]        The patient has a displaced fracture of the right femoral neck. . There has been no change in patient's status since last examined.  The procedure has been explained to the patient with potential risks , hazards , and complications and expected results, and post op follow up. Informed consent has been obtained. No guarantees made.Will proceed  today for Procedure(s) (LRB):  BIPOLAR HIP WITH DEPUY (Right) .      
  [facility]        2024         Post Op day: 2 Days Post-Op Procedure(s) (LRB):  BIPOLAR HIP WITH DEPUY (Right)      Admit Date: 2024  Admit Diagnosis: Hip fracture requiring operative repair, right, closed, initial encounter ([facility]) [S72.001A]       Principle Problem: Hip fracture requiring operative repair, right, closed, initial encounter ([facility]).           Subjective: Doing well, No complaints, No SOB, No Chest Pain, No Nausea or Vomiting     Objective:   Vital Signs are Stable, No Acute Distress, Alert,  Dressing is Dry,    Neurovascular check:OK     Assessment / Plan :2 days post rt bipolar, doing well  Patient Active Problem List   Diagnosis    Bronchiectasis with acute exacerbation (HCC)    Osteoporosis    Acquired hypothyroidism    Essential hypertension    Mammogram declined    Hx of colonoscopy    Medicare annual wellness visit, subsequent    Primary insomnia    Advanced care planning/counseling discussion    Moderate single current episode of major depressive disorder ([facility])    Anxiety    Chronic renal disease, stage III ([facility]) [[id number]]    Hip fracture requiring operative repair, right, closed, initial encounter ([facility])    Leukocytosis    Acute blood loss as cause of postoperative anemia    Patient Vitals for the past 8 hrs:   Weight   24 0638 49.9 kg (110 lb 0.2 oz)    Temp (24hrs), Av.7 °F (37.1 °C), Min:98.4 °F (36.9 °C), Max:99 °F (37.2 °C)    Body mass index is 18.88 kg/m².    Lab Results   Component Value Date/Time    HGB 11.3 2024 06:40 AM          S/P Procedure(s) (LRB):  BIPOLAR HIP WITH DEPUY (Right)        Medical Mgmt per hospitalist  Anticoagulation plan: Aspirin 325 mg daily for 30 days  Continue PT, WBAT   Dressing change:As needed only  Fall Precautions  DC disp: snf  F/U: 2 weeks postop for wound check and x rays       Signed By: Bismark Nielsen MD  2024,  7:26 AM      
  [facility]        2024         Post Op day: 4 Days Post-Op Procedure(s) (LRB):  BIPOLAR HIP WITH DEPUY (Right)      Admit Date: 2024  Admit Diagnosis: Hip fracture requiring operative repair, right, closed, initial encounter ([facility]) [S72.001A]       Principle Problem: Hip fracture requiring operative repair, right, closed, initial encounter ([facility]).           Subjective: Doing well, No complaints, No SOB, No Chest Pain, No Nausea or Vomiting     Objective:   Vital Signs are Stable, No Acute Distress, Alert,  Dressing is Dry,  Neurovascular exam is normal.     Assessment / Plan :  Patient Active Problem List   Diagnosis    Bronchiectasis with acute exacerbation ([facility])    Osteoporosis    Acquired hypothyroidism    Essential hypertension    Mammogram declined    Hx of colonoscopy    Medicare annual wellness visit, subsequent    Primary insomnia    Advanced care planning/counseling discussion    Moderate single current episode of major depressive disorder ([facility])    Anxiety    Chronic renal disease, stage III ([facility]) [[id number]]    Hip fracture requiring operative repair, right, closed, initial encounter ([facility])    Leukocytosis    Patient Vitals for the past 8 hrs:   BP Temp Temp src Pulse Resp SpO2 Weight   24 0736 (!) 153/81 97.7 °F (36.5 °C) Oral 80 19 94 % --   24 0141 -- -- -- -- -- -- 55.4 kg (122 lb 3.2 oz)    Temp (24hrs), Av.9 °F (36.6 °C), Min:97.7 °F (36.5 °C), Max:98.1 °F (36.7 °C)    Body mass index is 20.98 kg/m².    Lab Results   Component Value Date/Time    HGB 10.5 2024 05:20 AM          S/P Procedure(s) (LRB):  BIPOLAR HIP WITH DEPUY (Right)      Medical Mgmt per hospitalist  Anticoagulation plan: ASA 325mg daily  Continue PT, WBAT   Dressing change: Leave dressing in place, call if saturated   Fall Precautions  DC disp: rehab placement   F/U: 2 weeks postop for wound check and staple removals       Signed By: TEN Redd  2024,  8:57 AM      
  Physician Progress Note      PATIENT:               BRIANA TRINIDAD  CSN #:                  [id number]  :                       1940  ADMIT DATE:       2024 6:50 PM  DISCH DATE:        2024 12:48 PM  RESPONDING  PROVIDER #:        Melina Tenorio MD          QUERY TEXT:    Pt admitted with Displaced fracture right femoral neck. Pt noted to have Hx of   Osteoporosis. If possible, please document in progress notes and discharge   summary if you are evaluating and/or treating any of the following:    The medical record reflects the following:  Risk Factors: Mechanical Fall, Fragility Fx, Hx of Osteoporosis  Clinical Indicators: Displaced fracture right femoral neck  Bone density 10/7/14- osteoporosis; started on fosamax 10/2014.  Treatment: Hemiarthroplasty right hip using uncemented DePuy components  Options provided:  -- Pathological Displaced fracture right femoral neck fracture  -- Osteoporotic Displaced fracture right femoral neck Fracture  -- Osteoporotic Displaced fracture right femoral neck fracture following fall   which would not usually break a normal, healthy bone  -- Traumatic Displaced fracture right femoral neck fracture  -- Other - I will add my own diagnosis  -- Disagree - Not applicable / Not valid  -- Disagree - Clinically unable to determine / Unknown  -- Refer to Clinical Documentation Reviewer    PROVIDER RESPONSE TEXT:    This patient has an osteoporotic Displaced fracture right femoral neck   fracture.    Query created by: Neris Jean on 2024 10:24 AM      Electronically signed by:  Melina Tenorio MD 4/10/2024 1:52 PM          
ACUTE OCCUPATIONAL THERAPY GOALS:   (Developed with and agreed upon by patient and/or caregiver.)  1. Patient will verbalize and demonstrate understanding of hip precautions with 100% accuracy during ADL.   2. Patient will complete functional transfers with SBA and adaptive equipment as needed.   3. Patient will complete lower body bathing and dressing with minimal assistance and adaptive equipment as needed.   4. Patient will complete toileting with CGA.   5. Patient will tolerate at least 10 minutes of BUE therapeutic exercises to increase strength in BUE to aid in functional transfers.   6. Patient will tolerate at least 20 minutes of OT treatment with no rest breaks to increase activity tolerance for ADLs.     Timeframe: 7 visits           OCCUPATIONAL THERAPY Initial Assessment, Daily Note, and AM       OT Visit Days: 1  Acknowledge Orders  Time  OT Charge Capture  Rehab Caseload Tracker      Heather Osei is a 83 y.o. female   PRIMARY DIAGNOSIS: Hip fracture requiring operative repair, right, closed, initial encounter ([facility])  Hip fracture requiring operative repair, right, closed, initial encounter ([facility]) [S72.001A]  Procedure(s) (LRB):  BIPOLAR HIP WITH DEPUY (Right)  1 Day Post-Op  Reason for Referral: Pain in Right Hip (M25.551)  Stiffness of Right Hip, Not elsewhere classified (M25.651)  Generalized Muscle Weakness (M62.81)  Other lack of cordination (R27.8)  History of falling (Z91.81)  Inpatient: Payor: MEDICARE / Plan: MEDICARE PART A AND B / Product Type: *No Product type* /     ASSESSMENT:     REHAB RECOMMENDATIONS:   Recommendation to date pending progress:  Setting:  Short-term Rehab    Equipment:    To Be Determined     ASSESSMENT:  Ms. Osei presents to the hospital after falling and sustaining R hip fx. Pt is s/p R hip hemiarthroplasty and is WBAT in the R LE with posterior hip precautions. Pt has abduction pillow to be worn in the bed and placed sideways while up in the chair and try to 
ACUTE PHYSICAL THERAPY GOALS:   (Developed with and agreed upon by patient and/or caregiver.)  WBAT , posterior hip precautions,  abduction wedge in bed and flipped sideways in chair, do not elevated legs in chair.    All ongoing 4/6/2024    STG:  (1.)Ms. Osei will move from supine to sit and sit to supine , scoot up and down, and roll side to side with CONTACT GUARD ASSIST within 4 treatment day(s).    (2.)Ms. Osei will transfer from bed to chair and chair to bed with CONTACT GUARD ASSIST using the least restrictive device within 4 treatment day(s).    (3.)Ms. Osei will ambulate with CONTACT GUARD ASSIST for 100 feet with the least restrictive device within 4 treatment day(s).     LTG:  (1.)Ms. Osei will move from supine to sit and sit to supine , scoot up and down, and roll side to side in bed with STAND BY ASSIST within 7 treatment day(s).    (2.)Ms. Osei will transfer from bed to chair and chair to bed with STAND BY ASSIST using the least restrictive device within 7 treatment day(s).    (3.)Ms. Osei will ambulate with STAND BY ASSIST for 250 feet with the least restrictive device within 7 treatment day(s).  ________________________________________________________________________________________________     PHYSICAL THERAPY Initial Assessment and AM  (Link to Caseload Tracking: PT Visit Days : 1  Acknowledge Orders  Time In/Out  PT Charge Capture  Rehab Caseload Tracker    Heather Osei is a 83 y.o. female   PRIMARY DIAGNOSIS: Hip fracture requiring operative repair, right, closed, initial encounter ([facility])  Hip fracture requiring operative repair, right, closed, initial encounter ([facility]) [S72.001A]  Procedure(s) (LRB):  BIPOLAR HIP WITH DEPUY (Right)  1 Day Post-Op  Reason for Referral: Generalized Muscle Weakness (M62.81)  Other lack of cordination (R27.8)  Difficulty in walking, Not elsewhere classified (R26.2)  Other abnormalities of gait and mobility (R26.89)  Ataxic gait 
ACUTE PHYSICAL THERAPY GOALS:   (Developed with and agreed upon by patient and/or caregiver.)  WBAT , posterior hip precautions,  abduction wedge in bed and flipped sideways in chair, do not elevated legs in chair.    All ongoing 4/7/2024    STG:  (1.)Ms. Osei will move from supine to sit and sit to supine , scoot up and down, and roll side to side with CONTACT GUARD ASSIST within 4 treatment day(s).    (2.)Ms. Osei will transfer from bed to chair and chair to bed with CONTACT GUARD ASSIST using the least restrictive device within 4 treatment day(s).    (3.)Ms. Osei will ambulate with CONTACT GUARD ASSIST for 100 feet with the least restrictive device within 4 treatment day(s).     LTG:  (1.)Ms. Osei will move from supine to sit and sit to supine , scoot up and down, and roll side to side in bed with STAND BY ASSIST within 7 treatment day(s).    (2.)Ms. Osei will transfer from bed to chair and chair to bed with STAND BY ASSIST using the least restrictive device within 7 treatment day(s).    (3.)Ms. Osei will ambulate with STAND BY ASSIST for 250 feet with the least restrictive device within 7 treatment day(s).  ________________________________________________________________________________________________     PHYSICAL THERAPY Daily Note and PM  (Link to Caseload Tracking: PT Visit Days : 2  Acknowledge Orders  Time In/Out  PT Charge Capture  Rehab Caseload Tracker    Heather Osei is a 83 y.o. female   PRIMARY DIAGNOSIS: Hip fracture requiring operative repair, right, closed, initial encounter ([facility])  Hip fracture requiring operative repair, right, closed, initial encounter ([facility]) [S72.001A]  Procedure(s) (LRB):  BIPOLAR HIP WITH DEPUY (Right)  2 Days Post-Op  Reason for Referral: Generalized Muscle Weakness (M62.81)  Other lack of cordination (R27.8)  Difficulty in walking, Not elsewhere classified (R26.2)  Other abnormalities of gait and mobility (R26.89)  Ataxic gait 
ACUTE PHYSICAL THERAPY GOALS:   (Developed with and agreed upon by patient and/or caregiver.)  WBAT , posterior hip precautions,  abduction wedge in bed and flipped sideways in chair, do not elevated legs in chair.    All ongoing 4/8/2024    STG:  (1.)Ms. Osei will move from supine to sit and sit to supine , scoot up and down, and roll side to side with CONTACT GUARD ASSIST within 4 treatment day(s).    (2.)Ms. Osei will transfer from bed to chair and chair to bed with CONTACT GUARD ASSIST using the least restrictive device within 4 treatment day(s).    (3.)Ms. Osei will ambulate with CONTACT GUARD ASSIST for 100 feet with the least restrictive device within 4 treatment day(s).     LTG:  (1.)Ms. Osei will move from supine to sit and sit to supine , scoot up and down, and roll side to side in bed with STAND BY ASSIST within 7 treatment day(s).    (2.)Ms. Osei will transfer from bed to chair and chair to bed with STAND BY ASSIST using the least restrictive device within 7 treatment day(s).    (3.)Ms. Osei will ambulate with STAND BY ASSIST for 250 feet with the least restrictive device within 7 treatment day(s).  ________________________________________________________________________________________________     PHYSICAL THERAPY Daily Note and PM  (Link to Caseload Tracking: PT Visit Days : 3  Acknowledge Orders  Time In/Out  PT Charge Capture  Rehab Caseload Tracker    Heather Osei is a 83 y.o. female   PRIMARY DIAGNOSIS: Hip fracture requiring operative repair, right, closed, initial encounter ([facility])  Hip fracture requiring operative repair, right, closed, initial encounter ([facility]) [S72.001A]  Procedure(s) (LRB):  BIPOLAR HIP WITH DEPUY (Right)  3 Days Post-Op  Reason for Referral: Generalized Muscle Weakness (M62.81)  Other lack of cordination (R27.8)  Difficulty in walking, Not elsewhere classified (R26.2)  Other abnormalities of gait and mobility (R26.89)  Ataxic gait 
ACUTE PHYSICAL THERAPY GOALS:   (Developed with and agreed upon by patient and/or caregiver.)  WBAT , posterior hip precautions,  abduction wedge in bed and flipped sideways in chair, do not elevated legs in chair.    STG:  (1.)Ms. Osei will move from supine to sit and sit to supine , scoot up and down, and roll side to side with CONTACT GUARD ASSIST within 4 treatment day(s).    (2.)Ms. Osei will transfer from bed to chair and chair to bed with CONTACT GUARD ASSIST using the least restrictive device within 4 treatment day(s).    (3.)Ms. Osei will ambulate with CONTACT GUARD ASSIST for 100 feet with the least restrictive device within 4 treatment day(s).     LTG:  (1.)Ms. Osei will move from supine to sit and sit to supine , scoot up and down, and roll side to side in bed with STAND BY ASSIST within 7 treatment day(s).    (2.)Ms. Osei will transfer from bed to chair and chair to bed with STAND BY ASSIST using the least restrictive device within 7 treatment day(s).    (3.)Ms. Osei will ambulate with STAND BY ASSIST for 250 feet with the least restrictive device within 7 treatment day(s).  ________________________________________________________________________________________________     PHYSICAL THERAPY Initial Assessment and AM  (Link to Caseload Tracking: PT Visit Days : 1  Acknowledge Orders  Time In/Out  PT Charge Capture  Rehab Caseload Tracker    Heather Osei is a 83 y.o. female   PRIMARY DIAGNOSIS: Hip fracture requiring operative repair, right, closed, initial encounter ([facility])  Hip fracture requiring operative repair, right, closed, initial encounter ([facility]) [S72.001A]  Procedure(s) (LRB):  BIPOLAR HIP WITH DEPUY (Right)  1 Day Post-Op  Reason for Referral: Generalized Muscle Weakness (M62.81)  Other lack of cordination (R27.8)  Difficulty in walking, Not elsewhere classified (R26.2)  Other abnormalities of gait and mobility (R26.89)  Ataxic gait (R26.0)  Unspecified Lack of 
Arrives back to room 411 from post-op via bed and transport. A/o X4. Right hip SI CDI. No bleeding noted. Respiration even and unlabored 20/min at rest. Denies pain at present. Family at bedside. Encouraged to call with needs.   
Comprehensive Nutrition Assessment    Type and Reason for Visit: Initial, Positive Nutrition Screen  Malnutrition Screening Tool: Malnutrition Screen  Have you recently lost weight without trying?: 0 to 1 pound (0 points)  Have you been eating poorly because of a decreased appetite?: No (0 points)  Malnutrition Screening Tool Score: 0    Nutrition Recommendations/Plan:   Meals and Snacks:  Diet: Continue current order  Nutrition Supplement Therapy:  Medical food supplement therapy:  Initiate Ensure Enlive three times per day (this provides 350 kcal and 20 grams protein per bottle)     Malnutrition Assessment:  Malnutrition Status: At risk for malnutrition (Comment) (low BMI)    thin however no wasting  Nutrition Assessment:  Nutrition History: Pt reports she eats well at home. She stated she is trying to gain wt. Pt reports consuming ~2 Boosts per day.      Do You Have Any Cultural, Lutheran, or Ethnic Food Preferences?: No   Weight History: Pt reports she lost ~11lb a year ago when her  passed away. She stated since then she has gained it back. Per EMR wt hx review from family medicine office visits 3/30/23 97lb, 6/6 97lb, 8/15 101lb 11/2 103lb, 2/23 104lb.   Nutrition Background:       PMH significant for osteoporosis, hypothyroid, CKD3, and HTN. Pt admitted with right hip fracture. S/p hemiarthroplasy 4/5.   Nutrition Interval:  Pt seen reclined in bed with many visitors present. She reports she has not had anything to eat today due to surgery.  She stated yesterday she only had 2 peanut butter and jelly sandwiches. Pt stated she was hungry, RD provided pt with snacks. Pt is agreeable to have Ensure with meals.     Current Nutrition Therapies:  ADULT DIET; Regular    Current Intake:   Average Meal Intake: Unable to assess (Diet just advanced) Average Supplements Intake: None Ordered      Anthropometric Measures:  Height: 162.6 cm (5' 4\")  Current Body Wt: 47.2 kg (104 lb) (4/5), Weight source: Other 
Resting quietly at present. NAD noted. Safety maintained through out the shift. To report off to on coming nurse.  
Reviewed notes  
TRANSFER - IN REPORT:    Verbal report received from MAYRA Campos on Heathre Osei  being received from [facility] for routine progression of patient care      Report consisted of patient's Situation, Background, Assessment and   Recommendations(SBAR).     Information from the following report(s) Nurse Handoff Report was reviewed with the receiving nurse.    Opportunity for questions and clarification was provided.      Assessment will be completed upon patient's arrival to unit and care assumed.    
TRANSFER - IN REPORT:    Verbal report received from MAYRA Noel on Heather Osei  being received from PACU for routine progression of patient care      Report consisted of patient's Situation, Background, Assessment and   Recommendations(SBAR).     Information from the following report(s) Nurse Handoff Report was reviewed with the receiving nurse.    Opportunity for questions and clarification was provided.      Assessment completed upon patient's arrival to unit and care assumed.    
TRANSFER - OUT REPORT:    Verbal report given to Adia ellen Osei  being transferred to Pre-OP for ordered procedure       Report consisted of patient's Situation, Background, Assessment and   Recommendations(SBAR).     Information from the following report(s) Nurse Handoff Report was reviewed with the receiving nurse.           Lines:   Peripheral IV 04/04/24 Right;Ventral Forearm (Active)        Opportunity for questions and clarification was provided.      Patient transported with:  Registered Nurse        
[] [] []      GAIT: I Mod I S SBA CGA Min Mod Max Total  NT x2 Comments:   Level of Assistance [] [] [] [x] [x] [] [] [] [] [] []    Distance 120  feet    DME Rolling Walker    Gait Quality Antalgic, Decreased aidee , and Decreased step clearance    Weightbearing Status      Stairs      I=Independent, Mod I=Modified Independent, S=Supervision, SBA=Standby Assistance, CGA=Contact Guard Assistance,   Min=Minimal Assistance, Mod=Moderate Assistance, Max=Maximal Assistance, Total=Total Assistance, NT=Not Tested    PLAN:   FREQUENCY AND DURATION: BID for duration of hospital stay or until stated goals are met, whichever comes first.    TREATMENT:   TREATMENT:   Therapeutic Activity (26 Minutes): Therapeutic activity included Sit to Supine, Scooting, Transfer Training, Ambulation on level ground, Sitting balance , and Standing balance to improve functional Activity tolerance, Balance, Mobility, and Strength.    TREATMENT GRID:  N/A    AFTER TREATMENT PRECAUTIONS: Bed, Bed/Chair Locked, Call light within reach, Needs within reach, and RN notified    INTERDISCIPLINARY COLLABORATION:  RN/ PCT and PT/ PTA    EDUCATION:      TIME IN/OUT:  Time In: 0914  Time Out: 0940  Minutes: 26    Tiffanie Moscoso PTA    
encounter: 49.9 kg (110 lb 0.2 oz).    Intake/Output Summary (Last 24 hours) at 4/7/2024 1148  Last data filed at 4/7/2024 0638  Gross per 24 hour   Intake 1144.5 ml   Output 1700 ml   Net -555.5 ml         Physical Exam:     General:    Alert, oriented, thin, frail  Head:  Normocephalic, atraumatic  Eyes:  Sclerae appear normal.  Pupils equally round.  ENT:  Nares appear normal.  Moist oral mucosa  Neck:  Trachea midline   CV:   RRR.  No m/r/g.    Lungs:   CTAB anterior. Respirations even/unlabored on RA  Abdomen:   Soft, nontender, nondistended.  Extremities: No edema. R hip Aquacel C/D/I.   Neuro:  A&O x 4. No focal deficits.  Psych:  Normal mood and affect.      I have personally reviewed labs and tests:  Recent Labs:  Recent Results (from the past 48 hour(s))   Basic Metabolic Panel    Collection Time: 04/06/24  5:07 AM   Result Value Ref Range    Sodium 134 (L) 136 - 146 mmol/L    Potassium 4.2 3.5 - 5.1 mmol/L    Chloride 106 103 - 113 mmol/L    CO2 21 21 - 32 mmol/L    Anion Gap 7 2 - 11 mmol/L    Glucose 113 (H) 65 - 100 mg/dL    BUN 12 8 - 23 MG/DL    Creatinine 0.90 0.6 - 1.0 MG/DL    Est, Glom Filt Rate 63 >60 ml/min/1.73m2    Calcium 8.2 (L) 8.3 - 10.4 MG/DL   Hemoglobin and Hematocrit    Collection Time: 04/06/24  5:07 AM   Result Value Ref Range    Hemoglobin 11.6 (L) 11.7 - 15.4 g/dL    Hematocrit 34.5 (L) 35.8 - 46.3 %   Basic Metabolic Panel    Collection Time: 04/07/24  6:40 AM   Result Value Ref Range    Sodium 138 136 - 146 mmol/L    Potassium 3.9 3.5 - 5.1 mmol/L    Chloride 112 103 - 113 mmol/L    CO2 24 21 - 32 mmol/L    Anion Gap 2 2 - 11 mmol/L    Glucose 103 (H) 65 - 100 mg/dL    BUN 16 8 - 23 MG/DL    Creatinine 0.90 0.6 - 1.0 MG/DL    Est, Glom Filt Rate 63 >60 ml/min/1.73m2    Calcium 8.3 8.3 - 10.4 MG/DL   CBC    Collection Time: 04/07/24  6:40 AM   Result Value Ref Range    WBC 17.8 (H) 4.3 - 11.1 K/uL    RBC 3.66 (L) 4.05 - 5.2 M/uL    Hemoglobin 11.3 (L) 11.7 - 15.4 g/dL    
GRID:   Date:  4/7/24 Date:   Date:     Activity/Exercise Parameters Parameters Parameters   Ankle pumps X 10 B     LAQ X 10 R     Hip abd X 10 R AA                                 AFTER TREATMENT PRECAUTIONS: Alarm Activated, Bed/Chair Locked, Call light within reach, Chair, Heels floated, Needs within reach, RN at bedside, and abduction wedge in place.       INTERDISCIPLINARY COLLABORATION:  RN/ PCT and PT/ PTA    EDUCATION:      TIME IN/OUT:  Time In: 1111  Time Out: 1135  Minutes: 24    BELEN SAUCEDO PTA   
stated goals are met, whichever comes first.    TREATMENT:     TREATMENT:   Co-Treatment PT/OT necessary due to patient's decreased overall endurance/tolerance levels, as well as need for high level skilled assistance to complete functional transfers/mobility and functional tasks  Self Care (38 minutes): Patient participated in upper body bathing, lower body bathing, upper body dressing, lower body dressing, and grooming ADLs in unsupported sitting and standing with minimal verbal, manual, and tactile cueing to increase independence, decrease assistance required, increase activity tolerance, and maintain precautions. Patient also participated in functional mobility, functional transfer, and adaptive equipment training to increase independence, decrease assistance required, increase activity tolerance, increase safety awareness, and maintain precautions.     TREATMENT GRID:  N/A    AFTER TREATMENT PRECAUTIONS: Bed, Call light within reach, Needs within reach, and RN notified    INTERDISCIPLINARY COLLABORATION:  RN/ PCT, PT/ PTA, and OT/ LOCK    EDUCATION:  Education Given To: Patient  Education Provided: Precautions;ADL Adaptive Strategies;Transfer Training  Education Method: Verbal;Demonstration  Barriers to Learning: None  Education Outcome: Verbalized understanding;Demonstrated understanding (able to teach back)    TOTAL TREATMENT DURATION AND TIME:  Time In: 1350  Time Out: 1428  Minutes: 38    JEANMARIE CARR, OT            
within reach, RN at bedside, and abduction wedge in place.       INTERDISCIPLINARY COLLABORATION:  RN/ PCT and PT/ PTA    EDUCATION:      TIME IN/OUT:  Time In: 0945  Time Out: 1008  Minutes: 23    Tiffanie Moscoso PTA

## 2024-04-24 ENCOUNTER — OFFICE VISIT (OUTPATIENT)
Dept: ORTHOPEDIC SURGERY | Age: 84
End: 2024-04-24

## 2024-04-24 DIAGNOSIS — S72.001A CLOSED HIP FRACTURE REQUIRING OPERATIVE REPAIR, RIGHT, INITIAL ENCOUNTER (HCC): Primary | ICD-10-CM

## 2024-04-24 PROCEDURE — 99024 POSTOP FOLLOW-UP VISIT: CPT | Performed by: PHYSICIAN ASSISTANT

## 2024-04-24 NOTE — PROGRESS NOTES
[facility]            Patient ID:  Name: Heather Osei  AGE/Gender: 84 y.o. female  MRN: [id number]  : 1940    Date of Service: 2024          ALLERGIES:   Allergies   Allergen Reactions    Azithromycin Rash    Levofloxacin Anxiety          History:  The patient is seen today for follow-up.  The patient sustained  a right Hip fracture and underwent ORIF at [facility].  The patient has been progressing with physical therapy.  Patient is here today for wound check.  They have no other complaints or concerns:      Physical Exam:       General:  On exam the patient is a pleasant 84 y.o. female in no acute distress, A&O x 3.   Hip: This incision is healing there is swelling consistent with the postoperative time frame. There is no drainage. ROM not assessed. The calf is soft and non-tender.         Assessment and Plan:   The incision is healing. I removed the staples and applied steri strips. OK to bathe. WBAT with walker. The patient was advised to notify us if drainage returns. We will follow up in 4 weeks or sooner if needed.       Electronically signed by:   TEN Redd  2024,  10:23 AM

## 2024-04-26 ENCOUNTER — CARE COORDINATION (OUTPATIENT)
Dept: CARE COORDINATION | Facility: CLINIC | Age: 84
End: 2024-04-26

## 2024-04-26 NOTE — CARE COORDINATION
Post Acute Facility Update    Care Transitions Post Acute Facility Update    Care Transitions Interventions      Post Acute Facility Update   Post Acute Facility: [facility] [place], operated by [facility]    ELOS: 21 days      Nursing   Prescribed diet: Adult reg, thin liquids    Nutrition intake assessment: 50%   Reported Nursing Updates: No concerns or med changes at this time.VSS          Rehab/Functional   Prior Level of Functioning: Prior to hospitalization, the patient was independent with all ADL's including driving. Patient doesn't cook but states that her son loves to go out to eat. Patient's home is a 1 story home with 3 steps to enter. She uses no DME to ambulate but does have a rolling walker.   ADLs: Minimal Assistance   Bed Mobility: Contact Guard Assist - Hands on patient for balance   Transfer Assistance: Contact Guard Assist - Hands on patient for balance   Ambulation Assistance: Contact Guard Assist - Hands on patient for balance, Stand by Assist - Presence and Cueing   How far (in feet) is the patient ambulating?: 200   Rehab Notes: CGA bed mobility and transfers.  Ambulating 200' with SBA using RW.  Ind UB self care and min assist for LB.  Therapy DC 5/7.         SW/Discharge Planning   Goals of Care: Return home   Anticipated date for discharge: 5/8/24   Discharge Planning / Barriers: TBD  Prior to hospitalization, the patient was independent with all ADL's including driving. Patient doesn't cook but states that her son loves to go out to eat. Patient's home is a 1 story home with 3 steps to enter. She uses no DME to ambulate but does have a rolling walker.      Next IDT Planned Review: 5/2/24           Next IDT Planned Review: 5/3/24    Future Appointments   Date Time Provider Department Center   6/5/2024 10:00 AM Bismark Nielsen MD [facility] AM[facility]   8/23/2024  8:40 AM Jason Hurd MD [facility] AMB         Aziza Herzog RN

## 2024-05-07 ENCOUNTER — CARE COORDINATION (OUTPATIENT)
Dept: CARE COORDINATION | Facility: CLINIC | Age: 84
End: 2024-05-07

## 2024-05-07 NOTE — CARE COORDINATION
Post Acute Facility Update    Care Transitions Post Acute Facility Update    Care Transitions Interventions      Post Acute Facility Update   Post Acute Facility: [facility]    ELOS: 21 days      Nursing   Prescribed diet: no change    Reported Nursing Updates: Resident is set to d/c from therapy on 5/7/24, no med changes or concern at this time.         Rehab/Functional   ADLs: Stand by Assist - Presence and Cueing   Bed Mobility: Stand by Assist - Presence and Cueing   Transfer Assistance: Stand by Assist - Presence and Cueing   Ambulation Assistance: Stand by Assist - Presence and Cueing   How far (in feet) is the patient ambulating?: 250   Rehab Notes: SBA bed mobility and supervision transfers.  Ambulating 250' using RW at SBA.  Ind UB self care and supervision LB self care.  Therapy DC 5/7.         SW/Discharge Planning   Additional caregiver needs: [facility]   Anticipated date for discharge: 5/8/24   Discharge Planning / Barriers: Patient plans to discharge back home upon the completion of Short-Term Rehab. Therapy has set a LDC on 5/7/2024 and D/C on 5/8/2024. [facility] was requested. Patient also was agreeable to Meals on Wheels and the referral has been sent.   Prior to hospitalization, the patient was independent with all ADL's including driving. Patient doesn't cook but states that her son loves to go out to eat. Patient's home is a 1 story home with 3 steps to enter. She uses no DME to ambulate but does have a rolling walker.      Next IDT Planned Review: 5/9/24           Next IDT Planned Review: 5/9/24    Future Appointments   Date Time Provider Department Center   6/5/2024 10:00 AM Bismark Nielsen MD [facility] AMB   8/23/2024  8:40 AM Jason Hurd MD [facility] AMB         Aziza Herzog RN

## 2024-05-08 ENCOUNTER — HOME HEALTH ADMISSION (OUTPATIENT)
Dept: HOME HEALTH SERVICES | Facility: HOME HEALTH | Age: 84
End: 2024-05-08

## 2024-05-09 ENCOUNTER — TELEPHONE (OUTPATIENT)
Dept: FAMILY MEDICINE CLINIC | Facility: CLINIC | Age: 84
End: 2024-05-09

## 2024-05-09 NOTE — TELEPHONE ENCOUNTER
Dena Adame from Home Health called stating this patient was recently discharged from [facility] and they wanted verbal consent on the order of them  her up and starting her plan of care. Would like to make sure he will sign off on any papers they send on her plan of care.     States the nurse can call them back and give the okay in place of the doctor if he agrees.     Can call Dena back at [phone]

## 2024-05-09 NOTE — TELEPHONE ENCOUNTER
Durga Bond can you call her and let her know Dr. Hurd gave verbal consent for the order for this patient? She said it had to be him or the nurse/MA.

## 2024-05-13 ENCOUNTER — CARE COORDINATION (OUTPATIENT)
Dept: CARE COORDINATION | Facility: CLINIC | Age: 84
End: 2024-05-13

## 2024-05-13 NOTE — CARE COORDINATION
Care Transitions Outreach Attempt    Call within 2 business days of discharge: No   Attempted to reach patient for transitions of care follow up. Unable to reach patient.    Patient: Heather Osei Patient : 1940 MRN: [id number]    Last Discharge Facility       Date Complaint Diagnosis Description Type Department Provider    24  Hip fracture requiring operative repair, right, closed, initial encounter (HCC) Admission (Discharged) S[facility] Melina Tenorio MD              Was this an external facility discharge? Yes, 24  Discharge Facility Name: [facility] SOC verified.    Noted following upcoming appointments from discharge chart review:   [facility] follow up appointment(s):   Future Appointments   Date Time Provider Department Center   2024 10:00 AM Bismark Nielsen MD [facility] AMB   2024  8:40 AM Jason Hurd MD [facility] AMB

## 2024-05-14 ENCOUNTER — CARE COORDINATION (OUTPATIENT)
Dept: CARE COORDINATION | Facility: CLINIC | Age: 84
End: 2024-05-14

## 2024-05-14 ENCOUNTER — OFFICE VISIT (OUTPATIENT)
Dept: FAMILY MEDICINE CLINIC | Facility: CLINIC | Age: 84
End: 2024-05-14
Payer: MEDICARE

## 2024-05-14 VITALS
BODY MASS INDEX: 17.75 KG/M2 | OXYGEN SATURATION: 98 % | HEIGHT: 64 IN | TEMPERATURE: 97.3 F | WEIGHT: 104 LBS | HEART RATE: 73 BPM | SYSTOLIC BLOOD PRESSURE: 160 MMHG | DIASTOLIC BLOOD PRESSURE: 75 MMHG | RESPIRATION RATE: 16 BRPM

## 2024-05-14 DIAGNOSIS — S72.001D: Primary | ICD-10-CM

## 2024-05-14 PROCEDURE — 1036F TOBACCO NON-USER: CPT | Performed by: FAMILY MEDICINE

## 2024-05-14 PROCEDURE — 99212 OFFICE O/P EST SF 10 MIN: CPT | Performed by: FAMILY MEDICINE

## 2024-05-14 PROCEDURE — G8419 CALC BMI OUT NRM PARAM NOF/U: HCPCS | Performed by: FAMILY MEDICINE

## 2024-05-14 PROCEDURE — 1090F PRES/ABSN URINE INCON ASSESS: CPT | Performed by: FAMILY MEDICINE

## 2024-05-14 PROCEDURE — 1123F ACP DISCUSS/DSCN MKR DOCD: CPT | Performed by: FAMILY MEDICINE

## 2024-05-14 PROCEDURE — G8427 DOCREV CUR MEDS BY ELIG CLIN: HCPCS | Performed by: FAMILY MEDICINE

## 2024-05-14 ASSESSMENT — PATIENT HEALTH QUESTIONNAIRE - PHQ9
1. LITTLE INTEREST OR PLEASURE IN DOING THINGS: NOT AT ALL
8. MOVING OR SPEAKING SO SLOWLY THAT OTHER PEOPLE COULD HAVE NOTICED. OR THE OPPOSITE, BEING SO FIGETY OR RESTLESS THAT YOU HAVE BEEN MOVING AROUND A LOT MORE THAN USUAL: NOT AT ALL
3. TROUBLE FALLING OR STAYING ASLEEP: SEVERAL DAYS
SUM OF ALL RESPONSES TO PHQ9 QUESTIONS 1 & 2: 0
10. IF YOU CHECKED OFF ANY PROBLEMS, HOW DIFFICULT HAVE THESE PROBLEMS MADE IT FOR YOU TO DO YOUR WORK, TAKE CARE OF THINGS AT HOME, OR GET ALONG WITH OTHER PEOPLE: NOT DIFFICULT AT ALL
SUM OF ALL RESPONSES TO PHQ QUESTIONS 1-9: 1
4. FEELING TIRED OR HAVING LITTLE ENERGY: NOT AT ALL
7. TROUBLE CONCENTRATING ON THINGS, SUCH AS READING THE NEWSPAPER OR WATCHING TELEVISION: NOT AT ALL
6. FEELING BAD ABOUT YOURSELF - OR THAT YOU ARE A FAILURE OR HAVE LET YOURSELF OR YOUR FAMILY DOWN: NOT AT ALL
SUM OF ALL RESPONSES TO PHQ QUESTIONS 1-9: 1
2. FEELING DOWN, DEPRESSED OR HOPELESS: NOT AT ALL
9. THOUGHTS THAT YOU WOULD BE BETTER OFF DEAD, OR OF HURTING YOURSELF: NOT AT ALL
5. POOR APPETITE OR OVEREATING: NOT AT ALL

## 2024-05-14 NOTE — PROGRESS NOTES
Subjective:   Heather Osei is a 84 y.o. female presents today for follow-up from hospitalization from 4-4-2024 until 4-9-2024 for right hip fracture requiring operative repair.  She is doing well, has PT coming to her home.  She has a walker    Allergies   Allergen Reactions    Azithromycin Rash    Levofloxacin Anxiety     PMH, MEDS reviewed     Objective:   Blood pressure (!) 160/75, pulse 73, temperature 97.3 °F (36.3 °C), temperature source Temporal, resp. rate 16, height 1.626 m (5' 4\"), weight 47.2 kg (104 lb), SpO2 98 %.  General- Pleasant and no distress  Psych- alert and oriented to person, place and time  Mood and affect are appropriate to situation  Musculoskeletal - Gait and station are ok with walker  Neurological- grossly intact.   rrr s mrg.   bcta    Assessment/Plan:     1. Closed hip fracture requiring operative repair with routine healing, right         1. Closed hip fracture requiring operative repair with routine healing, right    Doing well.  Follow-up as needed  Followup:   No follow-ups on file.

## 2024-05-14 NOTE — CARE COORDINATION
Care Transitions Initial Follow Up Call    Call within 2 business days of discharge: No    Patient Current Location:  Home: [address] [place] [address] [place] [id number]    Post Acute Care Manager contacted the patient by telephone to perform post hospital discharge assessment. Verified name and  with patient as identifiers. Provided introduction to self, and explanation of the Post Acute Care Manager role.     Patient: Heather Osei Patient : 1940   MRN: [id number]  Reason for Admission: Hip fx. Rt  Discharge Date: 24 RARS: Readmission Risk Score: 10.7      Last Discharge Facility       Date Complaint Diagnosis Description Type Department Provider    24  Hip fracture requiring operative repair, right, closed, initial encounter ([facility]) Admission (Discharged) SFD4MS Melina Tenorio MD            Was this an external facility discharge? Yes, 24  Discharge Facility: [facility]    Challenges to be reviewed by the provider   Additional needs identified to be addressed with provider: No  none               Method of communication with provider: none.    Patient is an 82 y/o female with medical history of osteoporosis, hypothyroidism, CKD III, hypertension who presented s/p mechanical fall when she missed a step and landed on her R hip. She felt immediate pain in R hip after the fall and was unable to ambulate/bear weight. No head impact or loss of consciousness. No precipitating chest pain, palpitations, shortness of breath, visual changes, focal weakness, sensory or coordination changes. Hemodynamics stable on arrival. Labs unremarkable. Imaging revealed an acute right femoral neck fracture. Hospitalist admitted. Orthopedics consulted. Patient underwent right hemiarthroplasty with Dr. Nielsen . Therapy evaluations with rehab recommendations and patient is WBAT and fall precautions.     WBC not checked on arrival. Was obtained pre-operatively  and noted to be elevated at 16.4. Patient remains afebrile,

## 2024-06-05 ENCOUNTER — HOSPITAL ENCOUNTER (OUTPATIENT)
Dept: GENERAL RADIOLOGY | Age: 84
Discharge: HOME | End: 2024-06-08
Payer: MEDICARE

## 2024-06-05 ENCOUNTER — TELEPHONE (OUTPATIENT)
Dept: ORTHOPEDIC SURGERY | Age: 84
End: 2024-06-05

## 2024-06-05 DIAGNOSIS — S72.001A CLOSED FRACTURE OF RIGHT HIP, INITIAL ENCOUNTER (HCC): ICD-10-CM

## 2024-06-05 PROCEDURE — 73502 X-RAY EXAM HIP UNI 2-3 VIEWS: CPT

## 2024-06-05 NOTE — TELEPHONE ENCOUNTER
The   computer  shows  this pt  came in this  morning and  was  xr.  She  had  a friend  call in  for  her  bc  the pt  thinks  she missed this  mornings  appt  and  did not  see the  doctor..  They then asked  when would  she  know  about  the  xr  results?    Can  someone  please  call the pt  and speak  with her  and  also schedule  her  followup  if  needed?

## 2024-06-12 ENCOUNTER — OFFICE VISIT (OUTPATIENT)
Dept: ORTHOPEDIC SURGERY | Age: 84
End: 2024-06-12

## 2024-06-12 DIAGNOSIS — S72.001A CLOSED HIP FRACTURE REQUIRING OPERATIVE REPAIR, RIGHT, INITIAL ENCOUNTER (HCC): Primary | ICD-10-CM

## 2024-06-12 PROCEDURE — 99024 POSTOP FOLLOW-UP VISIT: CPT | Performed by: ORTHOPAEDIC SURGERY

## 2024-06-12 NOTE — PROGRESS NOTES
Subjective: 10 weeks postop hemiarthroplasty of the right hip.  She says she is coming along well except she said the right hip feels like a log.  She complains of some burning in her feet and also wants to know if she is to continue taking adult aspirin.  She also wonders about driving.    Objective: She has a well-healed scar.  She is very independent walking with cane and walker without difficulty.  Cane adjusted for her height.  She is advised that she could drop down to a baby aspirin a day insert adult aspirin.  From an orthopedic standpoint there is no problem with her driving but I told her that the ultimate decision is her family's.  As far as burning in her feet is concerned, she is advised to take it up with her primary care whether they want to put her on gabapentin or something like that.    X-rays of the pelvis and right hip done last week reveals good position alignment and fixation of hemiarthroplasty of the right hip.    A&P: Patient is coming along very well 10 weeks post hemiarthroplasty of the right hip.  Recommendations are as noted above.  She is discharged from the orthopedic clinic.

## 2024-07-10 ENCOUNTER — OFFICE VISIT (OUTPATIENT)
Dept: FAMILY MEDICINE CLINIC | Facility: CLINIC | Age: 84
End: 2024-07-10
Payer: MEDICARE

## 2024-07-10 VITALS
OXYGEN SATURATION: 96 % | RESPIRATION RATE: 16 BRPM | WEIGHT: 102 LBS | DIASTOLIC BLOOD PRESSURE: 64 MMHG | TEMPERATURE: 97.6 F | HEIGHT: 64 IN | BODY MASS INDEX: 17.42 KG/M2 | SYSTOLIC BLOOD PRESSURE: 132 MMHG | HEART RATE: 70 BPM

## 2024-07-10 DIAGNOSIS — F32.1 MODERATE SINGLE CURRENT EPISODE OF MAJOR DEPRESSIVE DISORDER (HCC): ICD-10-CM

## 2024-07-10 DIAGNOSIS — R21 RASH: Primary | ICD-10-CM

## 2024-07-10 DIAGNOSIS — R49.0 MUSCLE TENSION DYSPHONIA: ICD-10-CM

## 2024-07-10 DIAGNOSIS — J38.7 PRESBYLARYNX: ICD-10-CM

## 2024-07-10 DIAGNOSIS — F41.9 ANXIETY: ICD-10-CM

## 2024-07-10 DIAGNOSIS — R49.0 HOARSENESS OF VOICE: ICD-10-CM

## 2024-07-10 PROCEDURE — G8419 CALC BMI OUT NRM PARAM NOF/U: HCPCS | Performed by: FAMILY MEDICINE

## 2024-07-10 PROCEDURE — 1123F ACP DISCUSS/DSCN MKR DOCD: CPT | Performed by: FAMILY MEDICINE

## 2024-07-10 PROCEDURE — G8427 DOCREV CUR MEDS BY ELIG CLIN: HCPCS | Performed by: FAMILY MEDICINE

## 2024-07-10 PROCEDURE — G2211 COMPLEX E/M VISIT ADD ON: HCPCS | Performed by: FAMILY MEDICINE

## 2024-07-10 PROCEDURE — 1090F PRES/ABSN URINE INCON ASSESS: CPT | Performed by: FAMILY MEDICINE

## 2024-07-10 PROCEDURE — 99213 OFFICE O/P EST LOW 20 MIN: CPT | Performed by: FAMILY MEDICINE

## 2024-07-10 PROCEDURE — 1036F TOBACCO NON-USER: CPT | Performed by: FAMILY MEDICINE

## 2024-07-10 ASSESSMENT — PATIENT HEALTH QUESTIONNAIRE - PHQ9
4. FEELING TIRED OR HAVING LITTLE ENERGY: NOT AT ALL
7. TROUBLE CONCENTRATING ON THINGS, SUCH AS READING THE NEWSPAPER OR WATCHING TELEVISION: NOT AT ALL
5. POOR APPETITE OR OVEREATING: NOT AT ALL
SUM OF ALL RESPONSES TO PHQ QUESTIONS 1-9: 0
10. IF YOU CHECKED OFF ANY PROBLEMS, HOW DIFFICULT HAVE THESE PROBLEMS MADE IT FOR YOU TO DO YOUR WORK, TAKE CARE OF THINGS AT HOME, OR GET ALONG WITH OTHER PEOPLE: NOT DIFFICULT AT ALL
SUM OF ALL RESPONSES TO PHQ QUESTIONS 1-9: 1
3. TROUBLE FALLING OR STAYING ASLEEP: NOT AT ALL
SUM OF ALL RESPONSES TO PHQ QUESTIONS 1-9: 1
SUM OF ALL RESPONSES TO PHQ QUESTIONS 1-9: 0
9. THOUGHTS THAT YOU WOULD BE BETTER OFF DEAD, OR OF HURTING YOURSELF: NOT AT ALL
SUM OF ALL RESPONSES TO PHQ9 QUESTIONS 1 & 2: 0
1. LITTLE INTEREST OR PLEASURE IN DOING THINGS: NOT AT ALL
1. LITTLE INTEREST OR PLEASURE IN DOING THINGS: NOT AT ALL
SUM OF ALL RESPONSES TO PHQ QUESTIONS 1-9: 0
2. FEELING DOWN, DEPRESSED OR HOPELESS: NOT AT ALL
2. FEELING DOWN, DEPRESSED OR HOPELESS: SEVERAL DAYS
SUM OF ALL RESPONSES TO PHQ QUESTIONS 1-9: 0
6. FEELING BAD ABOUT YOURSELF - OR THAT YOU ARE A FAILURE OR HAVE LET YOURSELF OR YOUR FAMILY DOWN: NOT AT ALL
SUM OF ALL RESPONSES TO PHQ QUESTIONS 1-9: 1
8. MOVING OR SPEAKING SO SLOWLY THAT OTHER PEOPLE COULD HAVE NOTICED. OR THE OPPOSITE, BEING SO FIGETY OR RESTLESS THAT YOU HAVE BEEN MOVING AROUND A LOT MORE THAN USUAL: NOT AT ALL
SUM OF ALL RESPONSES TO PHQ9 QUESTIONS 1 & 2: 1
SUM OF ALL RESPONSES TO PHQ QUESTIONS 1-9: 1

## 2024-07-10 NOTE — PROGRESS NOTES
Subjective:   Heather Osei is a 84 y.o. female presents today for a rash on the arms was beginning to spread to the back.  Maybe sometimes itched.  Thought it might be related to the heat.  Seems to have gone away.  Wants referral to speech therapy.  That was recommended following exam with ENT.  Speech therapy apparently has reached out to her on 3 occasions but she has not returned the call  She admits to anxiety.  Allergies   Allergen Reactions    Azithromycin Rash    Levofloxacin Anxiety     PMH, MEDS reviewed     Objective:   Blood pressure 132/64, pulse 70, temperature 97.6 °F (36.4 °C), resp. rate 16, height 1.626 m (5' 4\"), weight 46.3 kg (102 lb), SpO2 96 %.  General- Pleasant and no distress  Psych- alert and oriented to person, place and time  Mood and affect are appropriate to situation  Musculoskeletal - Gait and station examination reveals mid-position with no abnormalities.  Neurological- grossly intact.   rrr s mrg.   bcta  Axilla and back did not reveal any skin rash.  Assessment/Plan:     1. Rash    2. Hoarseness of voice    3. Muscle tension dysphonia    4. Presbylarynx    5. Moderate single current episode of major depressive disorder (HCC)    6. Anxiety         1. Rash  2. Hoarseness of voice  -     [facility], [facility]  3. Muscle tension dysphonia  -     [facility], [facility]  4. Presbylarynx  -     [facility], [facility]  5. Moderate single current episode of major depressive disorder (HCC)  6. Anxiety    Reassurance given regarding the rash, seems like it is gone.  Referral again to speech therapy   f  No follow-ups on file.

## 2024-07-23 ENCOUNTER — TELEPHONE (OUTPATIENT)
Dept: ORTHOPEDIC SURGERY | Age: 84
End: 2024-07-23

## 2024-07-23 NOTE — TELEPHONE ENCOUNTER
Pt called complaining of acute Incision site pain from BIPOLAR HIP WITH DEPUY(sx) in April 2024 with Dr. Nielsen. Pt states the area is warm, tender and discolored. Pt scheduled with Jeremie alba 7/24/24.

## 2024-07-24 ENCOUNTER — OFFICE VISIT (OUTPATIENT)
Dept: ORTHOPEDIC SURGERY | Age: 84
End: 2024-07-24
Payer: MEDICARE

## 2024-07-24 DIAGNOSIS — M70.61 TROCHANTERIC BURSITIS OF RIGHT HIP: Primary | ICD-10-CM

## 2024-07-24 PROCEDURE — G8428 CUR MEDS NOT DOCUMENT: HCPCS | Performed by: PHYSICIAN ASSISTANT

## 2024-07-24 PROCEDURE — 99212 OFFICE O/P EST SF 10 MIN: CPT | Performed by: PHYSICIAN ASSISTANT

## 2024-07-24 PROCEDURE — 1090F PRES/ABSN URINE INCON ASSESS: CPT | Performed by: PHYSICIAN ASSISTANT

## 2024-07-24 PROCEDURE — 1036F TOBACCO NON-USER: CPT | Performed by: PHYSICIAN ASSISTANT

## 2024-07-24 PROCEDURE — G8419 CALC BMI OUT NRM PARAM NOF/U: HCPCS | Performed by: PHYSICIAN ASSISTANT

## 2024-07-24 PROCEDURE — 1123F ACP DISCUSS/DSCN MKR DOCD: CPT | Performed by: PHYSICIAN ASSISTANT

## 2024-07-24 NOTE — PROGRESS NOTES
[facility]          Patient ID:  Name: Heather Osei  AGE/Gender: 84 y.o. female  MRN: [id number]  : 1940    Date of Consultation:  2024          ALLERGIES:   Allergies   Allergen Reactions    Azithromycin Rash    Levofloxacin Anxiety            History:  The patient presents today for recheck of right hip.  The patient underwent hip hemiarthroplasty in April by Dr. Nielsen after right hip fracture.  She has been doing well having a little bit of discomfort over the lateral aspect of the right hip over the incision.  No groin pain.    Review of Systems:  Pertinent items are noted in HPI.    Past Medical History Includes:   Past Medical History:   Diagnosis Date    Hx of colonoscopy     Normal    Mammogram declined     Medicare annual wellness visit, subsequent     Postmenopausal osteoporosis    ,   Past Surgical History:   Procedure Laterality Date    DILATION AND CURETTAGE OF UTERUS      HIP SURGERY Right 2024    BIPOLAR HIP WITH DEPUY performed by Bismark Nielsen MD at [facility] MAIN OR    PARTIAL HYSTERECTOMY (CERVIX NOT REMOVED)  1988    still has ovaries       Family History:   Family History   Problem Relation Age of Onset    Stroke Brother     Heart Attack Mother         Social History:   Social History     Tobacco Use    Smoking status: Never     Passive exposure: Never    Smokeless tobacco: Never   Substance Use Topics    Alcohol use: No         Physical Exam:     General:  On exam the patient is a pleasant 84 y.o. female in no acute distress, A&O x 3. Ambulates with a without an antalgic gait without the aid of walker or cane.   HEENT: NC/AT, PERRL   Psych: normal mood, normal affect  Lungs: respirations even, normal breath sounds  MSK: On exam of the right hip the incision is completely healed there is no redness swelling rashes or wounds.  She does have tenderness on palpation of the greater trochanter.  She has excellent internal rotation and external rotation of
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
## 2024-08-15 ENCOUNTER — HOSPITAL ENCOUNTER (OUTPATIENT)
Dept: PHYSICAL THERAPY | Age: 84
Setting detail: RECURRING SERIES
Discharge: HOME | End: 2024-08-18
Payer: MEDICARE

## 2024-08-15 DIAGNOSIS — R49.0 DYSPHONIA: Primary | ICD-10-CM

## 2024-08-15 PROCEDURE — 92523 SPEECH SOUND LANG COMPREHEN: CPT

## 2024-08-15 PROCEDURE — 92507 TX SP LANG VOICE COMM INDIV: CPT

## 2024-08-15 NOTE — PROGRESS NOTES
weeks  Patient will demonstrate improved vocal quality/loudness/intonation to communicate effectively across environments.    Updated Objective Findings:  See evaluation note from today  Treatment   Voice    Caffeine intake: 2-3 cups coffee/day  Hydration: 12 ounces/day  Environmental irritants: denies seasonal allergies; does not do yard work   Medications: Cozaar \"An infrequently reported adverse reaction is coughing, which in turn, may cause hoarseness and possible vocal tissue damage. \"  Reflux History: denies reflux   Throat clearing: does endorse some throat clearing; x1 in session    Laryngeal and circumlaryngeal massage: Designed to reduce tension and improve open airway --    Low abdominal breathin min; mod clavicular movement; Biofeedback placing hand on belly   Yawn-sigh completed x5 with 80% accuracy min cues    Straw Phonation:  Voicing through straw on /u/: 5; Completed with 70% accuracy; mod cues   Pitch glides through straw on /u/: --  [place] through straw on /u/: --  Singing through straw: --    Frontal focus:   Prolong /m/ at comfortable pitch as long as possible --  Prolongation of /m/ gliding into vowels --  Prolongation of /m/ at the beginning of 1 syllable words --  Prolongation of /m/ at the beginning of simple sentences --    HEP: vocal hygiene protocol, yawn sigh, straw phonation    Treatment/Session Summary:    Communication/Consultation:  None today  Recommendations/Intent for next treatment session: Next visit will focus on goals.    Total Duration:  Time In: 0940  Time Out: 1020  Minutes: 40    Marline Gil, SLP

## 2024-08-22 ENCOUNTER — HOSPITAL ENCOUNTER (OUTPATIENT)
Dept: PHYSICAL THERAPY | Age: 84
Setting detail: RECURRING SERIES
Discharge: HOME | End: 2024-08-25
Payer: MEDICARE

## 2024-08-22 PROCEDURE — 92507 TX SP LANG VOICE COMM INDIV: CPT

## 2024-08-22 NOTE — PROGRESS NOTES
Heather Osei  : 1940  Primary: Medicare Part A And B  [facility] @ [address]  [place] [address]  Phone: [phone]  Fax: [phone]    Effective Dates:   8/15/2024 TO 2024   Frequency/Duration    1 time(s) a week for 90 days  SLP Visit Info:  Total # of Visits to Date: 2        OUTPATIENT SPEECH PATHOLOGY NOTE:Daily Note 2024  Appt Desk   Episode  Charges Attendance Report   Events        Treatment Diagnosis:    Dysphonia  Medical/Referring Diagnosis:    Dysphonia [R49.0]  Other diseases of larynx [J38.7]  Referring Physician:  Jason Hurd MD MD Orders:  Eval and Treat   Allergies:  Azithromycin and Levofloxacin  Medications Last Reviewed:  2024  Subjective Comments:  Patient reports no changes with voice. \"I've been drinking more water\", has eliminated 1 cup of coffee.  Pain:  Patient does not c/o pain.   Interventions Planned: (Treatment may consist of any combination of the following)        See Assessment Note  GOALS: (Goals have been discussed and agreed upon with patient.)  Short-Term Functional Goals: Time Frame: 12 weeks  Patient will demonstrate implementation of vocal hygiene techniques including improved hydration, reduced habitual coughing, throat clearing, and reduction of vocally abusive behaviors with 90% adherence on a daily basis to reduce laryngeal inflammation.  Patient will complete laryngeal massage and voice relaxation techniques on a daily basis with 80% accuracy.    Patient will complete abdominal breathing, respiratory retraining exercises such as closed mouth breathing, sniff/blow, etc with 80% accuracy and mod cues.   Patient will demonstrate resonant voice therapy techniques across tasks with 80% accuracy given mod cues.  Patient will use forward focus of voice energy with 70% accuracy in words/phrases/sentences to allow natural resonance of pitch without vocal strain or

## 2024-08-23 ENCOUNTER — OFFICE VISIT (OUTPATIENT)
Dept: FAMILY MEDICINE CLINIC | Facility: CLINIC | Age: 84
End: 2024-08-23

## 2024-08-23 VITALS
BODY MASS INDEX: 17.38 KG/M2 | TEMPERATURE: 97.3 F | SYSTOLIC BLOOD PRESSURE: 159 MMHG | HEART RATE: 60 BPM | RESPIRATION RATE: 12 BRPM | DIASTOLIC BLOOD PRESSURE: 78 MMHG | WEIGHT: 101.8 LBS | OXYGEN SATURATION: 98 % | HEIGHT: 64 IN

## 2024-08-23 DIAGNOSIS — I10 ESSENTIAL HYPERTENSION: Primary | ICD-10-CM

## 2024-08-23 DIAGNOSIS — F41.9 ANXIETY: ICD-10-CM

## 2024-08-23 DIAGNOSIS — E03.9 ACQUIRED HYPOTHYROIDISM: ICD-10-CM

## 2024-08-23 RX ORDER — LEVOTHYROXINE SODIUM 0.05 MG/1
50 TABLET ORAL
Qty: 90 TABLET | Refills: 1 | Status: SHIPPED | OUTPATIENT
Start: 2024-08-23

## 2024-08-23 RX ORDER — LOSARTAN POTASSIUM 50 MG/1
50 TABLET ORAL DAILY
Qty: 90 TABLET | Refills: 1 | Status: SHIPPED | OUTPATIENT
Start: 2024-08-23

## 2024-08-23 RX ORDER — BUSPIRONE HYDROCHLORIDE 15 MG/1
15 TABLET ORAL 3 TIMES DAILY
Qty: 270 TABLET | Refills: 1 | Status: SHIPPED | OUTPATIENT
Start: 2024-08-23

## 2024-08-23 RX ORDER — METOPROLOL SUCCINATE 50 MG/1
50 TABLET, EXTENDED RELEASE ORAL DAILY
Qty: 90 TABLET | Refills: 1 | Status: SHIPPED | OUTPATIENT
Start: 2024-08-23

## 2024-08-23 ASSESSMENT — PATIENT HEALTH QUESTIONNAIRE - PHQ9
1. LITTLE INTEREST OR PLEASURE IN DOING THINGS: NOT AT ALL
SUM OF ALL RESPONSES TO PHQ QUESTIONS 1-9: 1
SUM OF ALL RESPONSES TO PHQ QUESTIONS 1-9: 1
SUM OF ALL RESPONSES TO PHQ9 QUESTIONS 1 & 2: 1
2. FEELING DOWN, DEPRESSED OR HOPELESS: SEVERAL DAYS
SUM OF ALL RESPONSES TO PHQ QUESTIONS 1-9: 1
SUM OF ALL RESPONSES TO PHQ QUESTIONS 1-9: 1

## 2024-08-28 ENCOUNTER — TELEPHONE (OUTPATIENT)
Dept: FAMILY MEDICINE CLINIC | Facility: CLINIC | Age: 84
End: 2024-08-28
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
## 2024-08-29 ENCOUNTER — HOSPITAL ENCOUNTER (OUTPATIENT)
Dept: PHYSICAL THERAPY | Age: 84
Setting detail: RECURRING SERIES
End: 2024-08-29
Payer: MEDICARE

## 2024-08-29 NOTE — PROGRESS NOTES
Heather Osei  : 1940  Primary: Medicare Part A And B  Secondary: [place] [place] [facility] @ [address]  [place] [address]  Phone: [phone]  Fax: [phone]    SLP Visit Info:   No Show: 1 ()  Total # of Visits to Date: 2      OUTPATIENT THERAPY:SPEECH THERAPY NOTE               Episode  Appt Desk           Heather Osei did not show for her appointment for today due to unknown reasons.  Attempted to call patient. Will follow up at next appointment.   Thank you,  DANISHA Guajardo    Future Appointments   Date Time Provider Department Center   2024 10:30 AM Marline Gil SLP [facility]   2024 10:30 AM Marline Gil SLP [facility]   2024 10:30 AM Marline Gil SLP [facility]   2024 10:30 AM Marline Gil SLP [facility]   2024  9:20 AM Jason Hurd MD [facility]   2025  9:40 AM Jsaon Hurd MD [facility]

## 2024-09-05 ENCOUNTER — HOSPITAL ENCOUNTER (OUTPATIENT)
Dept: PHYSICAL THERAPY | Age: 84
Setting detail: RECURRING SERIES
Discharge: HOME | End: 2024-09-08
Payer: MEDICARE

## 2024-09-05 PROCEDURE — 92507 TX SP LANG VOICE COMM INDIV: CPT

## 2024-09-12 ENCOUNTER — HOSPITAL ENCOUNTER (OUTPATIENT)
Dept: PHYSICAL THERAPY | Age: 84
Setting detail: RECURRING SERIES
Discharge: HOME | End: 2024-09-15
Payer: MEDICARE

## 2024-09-12 PROCEDURE — 92507 TX SP LANG VOICE COMM INDIV: CPT

## 2024-09-19 ENCOUNTER — HOSPITAL ENCOUNTER (OUTPATIENT)
Dept: PHYSICAL THERAPY | Age: 84
Setting detail: RECURRING SERIES
Discharge: HOME | End: 2024-09-22
Payer: MEDICARE

## 2024-09-19 PROCEDURE — 92507 TX SP LANG VOICE COMM INDIV: CPT

## 2024-09-26 ENCOUNTER — HOSPITAL ENCOUNTER (OUTPATIENT)
Dept: PHYSICAL THERAPY | Age: 84
Setting detail: RECURRING SERIES
Discharge: HOME | End: 2024-09-29
Payer: MEDICARE

## 2024-09-26 PROCEDURE — 92507 TX SP LANG VOICE COMM INDIV: CPT

## 2024-09-26 NOTE — PROGRESS NOTES
Heather Osei  : 1940  Primary: Medicare Part A And B  [facility] @ [address]  [place] [address]  Phone: [phone]  Fax: [phone]    Effective Dates:   8/15/2024 TO 2024   Frequency/Duration    1 time(s) a week for 90 days  SLP Visit Info:  No Show: 1 ()  Total # of Visits to Date: 6      OUTPATIENT SPEECH PATHOLOGY NOTE:Daily Note 2024  Appt Desk   Episode  Charges Attendance Report   Events        Treatment Diagnosis:    Dysphonia  Medical/Referring Diagnosis:    Dysphonia [R49.0]  Other diseases of larynx [J38.7]  Referring Physician:  Jason Hurd MD MD Orders:  Eval and Treat   Allergies:  Azithromycin and Levofloxacin  Medications Last Reviewed:  2024  Subjective Comments:  Patient notes had a great night with her voice last night at [employer].   Pain:  Patient does not c/o pain.   Interventions Planned: (Treatment may consist of any combination of the following)        See Assessment Note  GOALS: (Goals have been discussed and agreed upon with patient.)  Short-Term Functional Goals: Time Frame: 12 weeks  Patient will demonstrate implementation of vocal hygiene techniques including improved hydration, reduced habitual coughing, throat clearing, and reduction of vocally abusive behaviors with 90% adherence on a daily basis to reduce laryngeal inflammation.  Patient will complete laryngeal massage and voice relaxation techniques on a daily basis with 80% accuracy.    Patient will complete abdominal breathing, respiratory retraining exercises such as closed mouth breathing, sniff/blow, etc with 80% accuracy and mod cues.   Patient will demonstrate resonant voice therapy techniques across tasks with 80% accuracy given mod cues.  Patient will use forward focus of voice energy with 70% accuracy in words/phrases/sentences to allow natural resonance of pitch without vocal strain or laryngeal tension, given

## 2024-10-03 ENCOUNTER — HOSPITAL ENCOUNTER (OUTPATIENT)
Dept: PHYSICAL THERAPY | Age: 84
Setting detail: RECURRING SERIES
Discharge: HOME | End: 2024-10-06
Payer: MEDICARE

## 2024-10-03 PROCEDURE — 92507 TX SP LANG VOICE COMM INDIV: CPT
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
## 2024-10-10 ENCOUNTER — HOSPITAL ENCOUNTER (OUTPATIENT)
Dept: PHYSICAL THERAPY | Age: 84
Setting detail: RECURRING SERIES
Discharge: HOME | End: 2024-10-13
Payer: MEDICARE

## 2024-10-10 PROCEDURE — 92507 TX SP LANG VOICE COMM INDIV: CPT

## 2024-10-10 NOTE — PROGRESS NOTES
Heather Osei  : 1940  Primary: Medicare Part A And B  [facility] @ [address]  [place] [address]  Phone: [phone]  Fax: [phone]    Effective Dates:   8/15/2024 TO 2024   Frequency/Duration    1 time(s) a week for 90 days  SLP Visit Info:  No Show: 1 ()  Total # of Visits to Date: 8      OUTPATIENT SPEECH PATHOLOGY NOTE:Daily Note 10/10/2024  Appt Desk   Episode  Charges Attendance Report   Events        Treatment Diagnosis:    Dysphonia  Medical/Referring Diagnosis:    Dysphonia [R49.0]  Other diseases of larynx [J38.7]  Referring Physician:  aJson Hurd MD MD Orders:  Eval and Treat   Allergies:  Azithromycin and Levofloxacin  Medications Last Reviewed:  10/10/2024  Subjective Comments:  Patient reports no pertinent updates.  Pain:  Patient does not c/o pain.   Interventions Planned: (Treatment may consist of any combination of the following)        See Assessment Note  GOALS: (Goals have been discussed and agreed upon with patient.)  Short-Term Functional Goals: Time Frame: 12 weeks  Patient will demonstrate implementation of vocal hygiene techniques including improved hydration, reduced habitual coughing, throat clearing, and reduction of vocally abusive behaviors with 90% adherence on a daily basis to reduce laryngeal inflammation.  Patient will complete laryngeal massage and voice relaxation techniques on a daily basis with 80% accuracy.    Patient will complete abdominal breathing, respiratory retraining exercises such as closed mouth breathing, sniff/blow, etc with 80% accuracy and mod cues.   Patient will demonstrate resonant voice therapy techniques across tasks with 80% accuracy given mod cues.  Patient will use forward focus of voice energy with 70% accuracy in words/phrases/sentences to allow natural resonance of pitch without vocal strain or laryngeal tension, given mod cues.  Discharge Goals:

## 2024-10-11 NOTE — PROGRESS NOTES
Outpatient Rehab Services  Referral Form/Physician Order  Central Scheduling Fax: [phone]  Speak to a :  Call [phone]   Please review and co-sign (electronically) OR sign and return to the below indicated clinic's fax number if you agree with this request.  Thank you!      NAME:  Heather Osei SSN:  [id number] :  1940   ADDRESS:  [address] [place] [address] [place] [address] Daytime Phone:  [phone] (home)[phone] (mobile)    Diagnosis & Date of Onset:  Closed hip fracture requiring operative repair, right Special Precautions:  Reason for referral: Pt with c/o R hip pain, decreased ROM, s/p R closed hip fracture repair. Pt completed previous home health therapy   Frequency:  [] to be determined by therapist after evaluation   OR   ____ X per week X ____ weeks    Services    [x] Evaluate & Treat  [] Special Orders________________________   [x] Physical Therapy  [] Occupational Therapy   [] Speech Therapy  [] MBS w/ Speech Therapy    Specialized Programs    [] Aquatic Therapy [] Lymphedema [] Oncology Rehab   [] Balance Rehab [] Parkinson's Program [] Osteoporosis   [] Fibromyalgia [] Motion Analysis [] Spine Rehab   [] Industrial Rehab [] Hand & Upper Extremity [] Sports Injury Rehab    [] Urinary Incontinence     Locations    @ [address], [address]  [place] [address]  Ph: [phone]  Fax: [phone] @ [address]  [place] [address]  Ph: [phone]  Fax: [phone] @ [address], [address]  [place] [address]  Ph: [phone]  Fax: [phone]        @ [address] [address]  Ph: [phone]  Fax: [phone] @ [address]  [place] [address]   Ph: [phone]  Fax: [phone] @ [place] [address] [place] [address]  Ph: [phone]  Fax: [phone]        @ [place] [id number]

## 2024-10-17 ENCOUNTER — HOSPITAL ENCOUNTER (OUTPATIENT)
Dept: PHYSICAL THERAPY | Age: 84
Setting detail: RECURRING SERIES
Discharge: HOME | End: 2024-10-20
Payer: MEDICARE

## 2024-10-17 PROCEDURE — 92507 TX SP LANG VOICE COMM INDIV: CPT

## 2024-10-17 NOTE — PROGRESS NOTES
today  Recommendations/Intent for next treatment session: Next visit will focus on goals.    Total Duration:  Time In: 1030  Time Out: 1115  Minutes: 45    Marline Gil, SLP

## 2024-10-23 ENCOUNTER — APPOINTMENT (OUTPATIENT)
Dept: PHYSICAL THERAPY | Age: 84
End: 2024-10-23
Payer: MEDICARE

## 2024-10-23 ENCOUNTER — TELEPHONE (OUTPATIENT)
Dept: FAMILY MEDICINE CLINIC | Facility: CLINIC | Age: 84
End: 2024-10-23

## 2024-10-25 ENCOUNTER — HOSPITAL ENCOUNTER (OUTPATIENT)
Dept: PHYSICAL THERAPY | Age: 84
Setting detail: RECURRING SERIES
Discharge: HOME | End: 2024-10-28
Payer: MEDICARE

## 2024-10-25 DIAGNOSIS — R26.2 DIFFICULTY IN WALKING, NOT ELSEWHERE CLASSIFIED: ICD-10-CM

## 2024-10-25 DIAGNOSIS — M25.551 PAIN IN RIGHT HIP: Primary | ICD-10-CM

## 2024-10-25 DIAGNOSIS — R29.898 WEAKNESS OF HIP, UNSPECIFIED LATERALITY: ICD-10-CM

## 2024-10-25 PROCEDURE — 92507 TX SP LANG VOICE COMM INDIV: CPT

## 2024-10-25 PROCEDURE — 97161 PT EVAL LOW COMPLEX 20 MIN: CPT

## 2024-10-25 PROCEDURE — 97140 MANUAL THERAPY 1/> REGIONS: CPT

## 2024-10-25 PROCEDURE — 97110 THERAPEUTIC EXERCISES: CPT

## 2024-10-25 PROCEDURE — 97162 PT EVAL MOD COMPLEX 30 MIN: CPT

## 2024-10-25 ASSESSMENT — PAIN SCALES - GENERAL: PAINLEVEL_OUTOF10: 2

## 2024-10-25 ASSESSMENT — PAIN DESCRIPTION - LOCATION: LOCATION: HIP

## 2024-10-25 ASSESSMENT — PAIN DESCRIPTION - ORIENTATION: ORIENTATION: RIGHT

## 2024-10-25 ASSESSMENT — PAIN DESCRIPTION - DESCRIPTORS: DESCRIPTORS: ACHING

## 2024-10-25 NOTE — PROGRESS NOTES
level.  Communication/Consultation:  None today  Recommendations/Intent for next treatment session: Next visit will focus on goals.    Total Duration:  Time In: 1030  Time Out: 1110  Minutes: 40    Marline Gil, SLP

## 2024-10-25 NOTE — THERAPY EVALUATION
I certify that the treatment plan above will be carried out by a therapist or under their direction.  Thank you for this referral,  NOLAN JACOBS PT     Referring Physician Signature: Jason Hurd MD _______________________________ Date _____________        Charge Capture  Events  Appt Desk  Attendance Report

## 2024-10-25 NOTE — PROGRESS NOTES
Heather Osei  : 1940  Primary: Medicare Part A And B (Medicare)  [facility] @ [address]  [place] [address]  Phone: [phone]  Fax: [phone] Plan Frequency: 1-2 times a week for 90 days    Plan of Care/Certification Expiration Date: 25        Plan of Care/Certification Expiration Date:  Plan of Care/Certification Expiration Date: 25    Frequency/Duration:   Plan Frequency: 1-2 times a week for 90 days      Time In/Out:   Time In: 1115  Time Out: 1200      PT Visit Info:    Progress Note Due Date: 24  Total # of Visits to Date: 1  Progress Note Counter: 1      Visit Count:  1    OUTPATIENT PHYSICAL THERAPY:   Treatment Note 10/25/2024       Episode  (PT-Closed hip fracture)               Treatment Diagnosis:    Pain in right hip  Weakness of hip, unspecified laterality  Difficulty in walking, not elsewhere classified  Medical/Referring Diagnosis:    Closed hip fracture (HCC)    Referring Physician:  Jason Hurd MD MD Orders:  PT Eval and Treat   Return MD Appt:  Unknown   Date of Onset:  Onset Date: 10/17/24 (6 months ago)   Allergies:   Azithromycin and Levofloxacin  Restrictions/Precautions:   None      Interventions Planned (Treatment may consist of any combination of the following):     See Assessment Note    Subjective Comments:   Patient complains of increased right hip pain and difficulty walking  Initial Pain Level:: Right Hip 2/10  Post Session Pain Level:  Right  Hip 2/10  Medications Last Reviewed:  10/25/2024  Updated Objective Findings:  See Evaluation Note from today  Treatment   THERAPEUTIC EXERCISE: (10 minutes):    Exercises per grid below to improve mobility and strength.  Required minimal verbal cues to promote proper body alignment.  Progressed repetitions as indicated.   Date:  10/25/2024 Date:   Date:     Activity/Exercise Parameters Parameters Parameters   Left sidelying-right IT band

## 2024-11-01 ENCOUNTER — HOSPITAL ENCOUNTER (OUTPATIENT)
Dept: PHYSICAL THERAPY | Age: 84
Setting detail: RECURRING SERIES
Discharge: HOME | End: 2024-11-04
Payer: MEDICARE

## 2024-11-01 PROCEDURE — 97140 MANUAL THERAPY 1/> REGIONS: CPT

## 2024-11-01 PROCEDURE — 97110 THERAPEUTIC EXERCISES: CPT

## 2024-11-01 ASSESSMENT — PAIN DESCRIPTION - LOCATION: LOCATION: HIP;BACK

## 2024-11-01 ASSESSMENT — PAIN SCALES - GENERAL: PAINLEVEL_OUTOF10: 2

## 2024-11-01 ASSESSMENT — PAIN DESCRIPTION - ORIENTATION: ORIENTATION: RIGHT

## 2024-11-01 ASSESSMENT — PAIN DESCRIPTION - DESCRIPTORS: DESCRIPTORS: ACHING

## 2024-11-01 NOTE — PROGRESS NOTES
Heather Osei  : 1940  Primary: Medicare Part A And B (Medicare)  [facility] [address]  [place] [address]  Phone: [phone]  Fax: [phone] Plan Frequency: 1-2 times a week for 90 days    Plan of Care/Certification Expiration Date: 25        Plan of Care/Certification Expiration Date:  Plan of Care/Certification Expiration Date: 25    Frequency/Duration:   Plan Frequency: 1-2 times a week for 90 days      Time In/Out:   Time In: 0900  Time Out: 0945      PT Visit Info:    Progress Note Due Date: 24  Total # of Visits to Date: 2  Progress Note Counter: 2      Visit Count:  2    OUTPATIENT PHYSICAL THERAPY:   Treatment Note 2024       Episode  (PT-Closed hip fracture)               Treatment Diagnosis:    Pain in right hip  Weakness of hip, unspecified laterality  Difficulty in walking, not elsewhere classified  Medical/Referring Diagnosis:    Closed hip fracture (HCC)    Referring Physician:  Jason Hurd MD MD Orders:  PT Eval and Treat   Return MD Appt:  Unknown   Date of Onset:  Onset Date: 10/17/24 (6 months ago)   Allergies:   Azithromycin and Levofloxacin  Restrictions/Precautions:   None      Interventions Planned (Treatment may consist of any combination of the following):     See Assessment Note    Subjective Comments:   Patient reports her low back is a little sore today.  \"I moved three wheel barrower fulls of leaves from my yard\".  Initial Pain Level:: Right Hip, Back 2/10  Post Session Pain Level:  Right  Hip, Back 2/10  Medications Last Reviewed:  2024  Updated Objective Findings:  None Today  Treatment   THERAPEUTIC EXERCISE: (25 minutes):    Exercises per grid below to improve mobility and strength.  Required minimal verbal cues to promote proper body alignment.  Progressed repetitions as indicated.   Date:  10/25/2024 Date:  2024 Date:     Activity/Exercise Parameters

## 2024-11-04 ENCOUNTER — TELEPHONE (OUTPATIENT)
Dept: FAMILY MEDICINE CLINIC | Facility: CLINIC | Age: 84
End: 2024-11-04

## 2024-11-04 ENCOUNTER — OFFICE VISIT (OUTPATIENT)
Dept: FAMILY MEDICINE CLINIC | Facility: CLINIC | Age: 84
End: 2024-11-04

## 2024-11-04 VITALS
HEART RATE: 57 BPM | HEIGHT: 64 IN | DIASTOLIC BLOOD PRESSURE: 75 MMHG | OXYGEN SATURATION: 99 % | SYSTOLIC BLOOD PRESSURE: 130 MMHG | TEMPERATURE: 97.3 F | WEIGHT: 100 LBS | RESPIRATION RATE: 16 BRPM | BODY MASS INDEX: 17.07 KG/M2

## 2024-11-04 DIAGNOSIS — N30.90 CYSTITIS: ICD-10-CM

## 2024-11-04 DIAGNOSIS — R30.0 DYSURIA: Primary | ICD-10-CM

## 2024-11-04 LAB
BILIRUBIN, URINE, POC: NEGATIVE
BLOOD URINE, POC: ABNORMAL
GLUCOSE URINE, POC: NEGATIVE
KETONES, URINE, POC: NEGATIVE
LEUKOCYTE ESTERASE, URINE, POC: ABNORMAL
NITRITE, URINE, POC: NEGATIVE
PH, URINE, POC: 6 (ref 4.6–8)
PROTEIN,URINE, POC: NEGATIVE
SPECIFIC GRAVITY, URINE, POC: 1.02 (ref 1–1.03)
URINALYSIS CLARITY, POC: CLEAR
URINALYSIS COLOR, POC: YELLOW
UROBILINOGEN, POC: ABNORMAL

## 2024-11-04 RX ORDER — CIPROFLOXACIN 500 MG/1
500 TABLET, FILM COATED ORAL 2 TIMES DAILY
Qty: 6 TABLET | Refills: 0 | Status: SHIPPED | OUTPATIENT
Start: 2024-11-04 | End: 2024-11-04

## 2024-11-04 RX ORDER — CIPROFLOXACIN 500 MG/1
500 TABLET, FILM COATED ORAL 2 TIMES DAILY
Qty: 6 TABLET | Refills: 0 | Status: SHIPPED | OUTPATIENT
Start: 2024-11-04 | End: 2024-11-07

## 2024-11-04 NOTE — PROGRESS NOTES
Subjective:   Heather Osei is a 84 y.o. female presents today for follow-up from presentation to [facility] over the weekend.  She had low back pain turns out she had a UTI.  She was given what sounds like Bactrim she thinks but is not absolutely sure but caused too much nausea and even an episode of emesis on rechallenge.  She thinks she got in 1 days worth of the medication.  Still having some dysuria and low back pain.    Allergies   Allergen Reactions    Azithromycin Rash    Levofloxacin Anxiety     PMH, MEDS reviewed     Objective:   Blood pressure 130/75, pulse 57, temperature 97.3 °F (36.3 °C), resp. rate 16, height 1.626 m (5' 4\"), weight 45.4 kg (100 lb), SpO2 99%.  General- Pleasant and no distress  Psych- alert and oriented to person, place and time  Mood and affect are appropriate to situation  Musculoskeletal - Gait and station examination reveals mid-position with no abnormalities.  Neurological- grossly intact.   rrr s mrg.   bcta  UA dip today shows negative nitrite but trace leukocyte esterase, and trace blood.  Assessment/Plan:     1. Dysuria    2. Cystitis         1. Dysuria  -     AMB POC URINALYSIS DIP STICK AUTO W/O MICRO  2. Cystitis  -     ciprofloxacin (CIPRO) 500 MG tablet; Take 1 tablet by mouth 2 times daily for 3 days, Disp-6 tablet, R-0Normal    Probably has residual UTI.  Cipro 500 twice a day for 3 days should be fine.  Push water f  No follow-ups on file.

## 2024-11-04 NOTE — TELEPHONE ENCOUNTER
Patient called requesting clarification from nurse regarding medication that she picked up today. States that she received 6 pills and that provider told her 3 days, however, the pharmacy labels states 10 days.     Please call backat [phone]

## 2024-11-07 ENCOUNTER — HOSPITAL ENCOUNTER (OUTPATIENT)
Dept: PHYSICAL THERAPY | Age: 84
Setting detail: RECURRING SERIES
End: 2024-11-07
Payer: MEDICARE

## 2024-11-07 ENCOUNTER — TELEPHONE (OUTPATIENT)
Dept: FAMILY MEDICINE CLINIC | Facility: CLINIC | Age: 84
End: 2024-11-07

## 2024-11-07 ENCOUNTER — HOSPITAL ENCOUNTER (OUTPATIENT)
Dept: PHYSICAL THERAPY | Age: 84
Setting detail: RECURRING SERIES
Discharge: HOME | End: 2024-11-10
Payer: MEDICARE

## 2024-11-07 NOTE — TELEPHONE ENCOUNTER
Pt called and said her back is hurting. She has been on a antibiotic for a Uti and only has 1 pill left. She wants to know if she needs to be seen or go on another round of antibiotics.

## 2024-11-07 NOTE — PROGRESS NOTES
Heather Osei  : 1940  Primary: Medicare Part A And B  Secondary: BCBS [facility] @ [address]  [place] [address]  Phone: [phone]  Fax: [phone]    PT Visit Info:    Total # of Visits to Date: 2  Progress Note Counter: 2  Progress Note Due Date: 24  Canceled Appointment: 1 (2024)     OT Visit Info:  No data recorded    OUTPATIENT THERAPY: 2024  Episode  Appt Desk        Heather Osei  cancelled for her appointment for today due to UTI.  Will plan to follow up next during next appointment.    []  Attempted to call patient    Thank you,  NOLAN JACOBS, PT    Future Appointments   Date Time Provider Department Center   2024  9:20 AM Jason Hurd MD [facility] DEP   2025  9:40 AM Jason Hurd MD [facility] DEP

## 2024-11-11 ENCOUNTER — TELEPHONE (OUTPATIENT)
Dept: FAMILY MEDICINE CLINIC | Facility: CLINIC | Age: 84
End: 2024-11-11

## 2024-11-11 NOTE — TELEPHONE ENCOUNTER
Patient called stating that she had went to the [facility] over the weekend with continued symptoms of UTI, She was prescribed Cipro again and wants provider to know and if he thinks its ok for her to take this again.    She is not having it filled until she hears back.    Please advise

## 2024-11-13 ENCOUNTER — OFFICE VISIT (OUTPATIENT)
Dept: FAMILY MEDICINE CLINIC | Facility: CLINIC | Age: 84
End: 2024-11-13

## 2024-11-13 VITALS
HEART RATE: 64 BPM | SYSTOLIC BLOOD PRESSURE: 136 MMHG | BODY MASS INDEX: 17.14 KG/M2 | TEMPERATURE: 97.1 F | HEIGHT: 64 IN | RESPIRATION RATE: 16 BRPM | WEIGHT: 100.4 LBS | OXYGEN SATURATION: 100 % | DIASTOLIC BLOOD PRESSURE: 71 MMHG

## 2024-11-13 DIAGNOSIS — Z00.00 MEDICARE ANNUAL WELLNESS VISIT, SUBSEQUENT: Primary | Chronic | ICD-10-CM

## 2024-11-13 DIAGNOSIS — R63.4 WEIGHT LOSS: ICD-10-CM

## 2024-11-13 DIAGNOSIS — D64.9 ANEMIA, UNSPECIFIED TYPE: ICD-10-CM

## 2024-11-13 DIAGNOSIS — D72.829 LEUKOCYTOSIS, UNSPECIFIED TYPE: ICD-10-CM

## 2024-11-13 LAB
BASOPHILS # BLD: 0.1 K/UL (ref 0–0.2)
BASOPHILS NFR BLD: 1 % (ref 0–2)
DIFFERENTIAL METHOD BLD: ABNORMAL
EOSINOPHIL # BLD: 0.1 K/UL (ref 0–0.8)
EOSINOPHIL NFR BLD: 1 % (ref 0.5–7.8)
ERYTHROCYTE [DISTWIDTH] IN BLOOD BY AUTOMATED COUNT: 13.2 % (ref 11.9–14.6)
FERRITIN SERPL-MCNC: 171 NG/ML (ref 8–388)
HCT VFR BLD AUTO: 42.9 % (ref 35.8–46.3)
HGB BLD-MCNC: 14 G/DL (ref 11.7–15.4)
IMM GRANULOCYTES # BLD AUTO: 0.1 K/UL (ref 0–0.5)
IMM GRANULOCYTES NFR BLD AUTO: 1 % (ref 0–5)
IRON SATN MFR SERPL: 41 % (ref 20–50)
IRON SERPL-MCNC: 122 UG/DL (ref 35–100)
LYMPHOCYTES # BLD: 1.3 K/UL (ref 0.5–4.6)
LYMPHOCYTES NFR BLD: 10 % (ref 13–44)
MCH RBC QN AUTO: 30.8 PG (ref 26.1–32.9)
MCHC RBC AUTO-ENTMCNC: 32.6 G/DL (ref 31.4–35)
MCV RBC AUTO: 94.3 FL (ref 82–102)
MONOCYTES # BLD: 1.2 K/UL (ref 0.1–1.3)
MONOCYTES NFR BLD: 9 % (ref 4–12)
NEUTS SEG # BLD: 10.5 K/UL (ref 1.7–8.2)
NEUTS SEG NFR BLD: 78 % (ref 43–78)
NRBC # BLD: 0 K/UL (ref 0–0.2)
PLATELET # BLD AUTO: 312 K/UL (ref 150–450)
PMV BLD AUTO: 12.8 FL (ref 9.4–12.3)
RBC # BLD AUTO: 4.55 M/UL (ref 4.05–5.2)
TIBC SERPL-MCNC: 298 UG/DL (ref 240–450)
UIBC SERPL-MCNC: 176 UG/DL (ref 112–347)
WBC # BLD AUTO: 13.2 K/UL (ref 4.3–11.1)

## 2024-11-13 ASSESSMENT — PATIENT HEALTH QUESTIONNAIRE - PHQ9
4. FEELING TIRED OR HAVING LITTLE ENERGY: SEVERAL DAYS
SUM OF ALL RESPONSES TO PHQ QUESTIONS 1-9: 0
SUM OF ALL RESPONSES TO PHQ QUESTIONS 1-9: 0
6. FEELING BAD ABOUT YOURSELF - OR THAT YOU ARE A FAILURE OR HAVE LET YOURSELF OR YOUR FAMILY DOWN: NOT AT ALL
5. POOR APPETITE OR OVEREATING: SEVERAL DAYS
SUM OF ALL RESPONSES TO PHQ QUESTIONS 1-9: 2
3. TROUBLE FALLING OR STAYING ASLEEP: NOT AT ALL
SUM OF ALL RESPONSES TO PHQ9 QUESTIONS 1 & 2: 0
1. LITTLE INTEREST OR PLEASURE IN DOING THINGS: NOT AT ALL
SUM OF ALL RESPONSES TO PHQ9 QUESTIONS 1 & 2: 0
7. TROUBLE CONCENTRATING ON THINGS, SUCH AS READING THE NEWSPAPER OR WATCHING TELEVISION: NOT AT ALL
SUM OF ALL RESPONSES TO PHQ QUESTIONS 1-9: 0
2. FEELING DOWN, DEPRESSED OR HOPELESS: NOT AT ALL
1. LITTLE INTEREST OR PLEASURE IN DOING THINGS: NOT AT ALL
8. MOVING OR SPEAKING SO SLOWLY THAT OTHER PEOPLE COULD HAVE NOTICED. OR THE OPPOSITE, BEING SO FIGETY OR RESTLESS THAT YOU HAVE BEEN MOVING AROUND A LOT MORE THAN USUAL: NOT AT ALL
SUM OF ALL RESPONSES TO PHQ QUESTIONS 1-9: 2
SUM OF ALL RESPONSES TO PHQ QUESTIONS 1-9: 2
9. THOUGHTS THAT YOU WOULD BE BETTER OFF DEAD, OR OF HURTING YOURSELF: NOT AT ALL
SUM OF ALL RESPONSES TO PHQ QUESTIONS 1-9: 2
2. FEELING DOWN, DEPRESSED OR HOPELESS: NOT AT ALL
SUM OF ALL RESPONSES TO PHQ QUESTIONS 1-9: 0

## 2024-11-13 NOTE — PROGRESS NOTES
Anxiety     Prior to Visit Medications    Medication Sig Taking? Authorizing Provider   busPIRone (BUSPAR) 15 MG tablet Take 15 mg by mouth 3 times daily Yes Jason Hurd MD   levothyroxine (SYNTHROID) 50 MCG tablet Take 1 tablet by mouth every morning (before breakfast) Yes Jason Hurd MD   losartan (COZAAR) 50 MG tablet Take 1 tablet by mouth daily Yes Jason Hurd MD   metoprolol succinate (TOPROL XL) 50 MG extended release tablet Take 1 tablet by mouth daily Yes Jason Hurd MD   melatonin 3 MG TABS tablet Take 1 tablet by mouth nightly as needed (insomnia) Yes Alie Burch PA   vitamin D (CHOLECALCIFEROL) 25 MCG (1000 UT) TABS tablet Take 1 tablet by mouth Yes Automatic Reconciliation, Ar       CareTeam (Including outside providers/suppliers regularly involved in providing care):   Patient Care Team:  Jason Hurd MD as PCP - General  Jason Hurd MD as PCP - Empaneled Provider      Reviewed and updated this visit:  Tobacco  Allergies  Meds  Med Hx  Surg Hx  Soc Hx  Fam Hx

## 2024-11-13 NOTE — PATIENT INSTRUCTIONS
life support and other treatment. The form is also called a declaration.  Medical power of .  This form lets you name a person to make treatment decisions for you when you can't speak for yourself. This person is called a health care agent (health care proxy, health care surrogate). The form is also called a durable power of  for health care.  If you do not have an advance directive, decisions about your medical care may be made by a family member, or by a doctor or a  who doesn't know you.  It may help to think of an advance directive as a gift to the people who care for you. If you have one, they won't have to make tough decisions by themselves.  For more information, including forms for your state, see the [employer] website (www.caringinfo.org/planning/advance-directives/).  Follow-up care is a key part of your treatment and safety. Be sure to make and go to all appointments, and call your doctor if you are having problems. It's also a good idea to know your test results and keep a list of the medicines you take.  What should you include in an advance directive?  Many states have a unique advance directive form. (It may ask you to address specific issues.) Or you might use a universal form that's approved by many states.  If your form doesn't tell you what to address, it may be hard to know what to include in your advance directive. Use the questions below to help you get started.  Who do you want to make decisions about your medical care if you are not able to?  What life-support measures do you want if you have a serious illness that gets worse over time or can't be cured?  What are you most afraid of that might happen? (Maybe you're afraid of having pain, losing your independence, or being kept alive by machines.)  Where would you prefer to die? (Your home? A hospital? A nursing home?)  Do you want to donate your organs when you die?  Do you want certain Protestant practices performed

## 2024-11-14 ENCOUNTER — HOSPITAL ENCOUNTER (OUTPATIENT)
Dept: PHYSICAL THERAPY | Age: 84
Setting detail: RECURRING SERIES
Discharge: HOME | End: 2024-11-17
Payer: MEDICARE

## 2024-11-14 PROCEDURE — 92507 TX SP LANG VOICE COMM INDIV: CPT

## 2024-11-14 NOTE — THERAPY RECERTIFICATION
Heather Osei  : 1940  Primary: Medicare Part A And B  Secondary: [facility] @ [address]  [place] [address]  Phone: [phone]  Fax: [phone]    Visit Info:    Effective Dates  2024 to 2025  Frequency/Duration    1 time(s) a week for 30-60 days   SPEECH LANGUAGE PATHOLOGY: COMMUNICATION    Recertification 2024     Appt Desk   Episode  Charges Attendance Report   Events        Treatment Diagnosis:    Dysphonia  Medical/Referring Diagnosis:   Dysphonia [R49.0]  Other diseases of larynx [J38.7]  Referring Physician:  Jason Hurd MD MD Orders:  Eval and Treat   Return MD Appt:  No current f/u  Date of Onset:  At least 1 year ago  Allergies:  Azithromycin and Levofloxacin  Medications Last Reviewed:  2024     SUBJECTIVE    History of Injury/Illness (Reason for Referral):  Pt referred for hoarseness. Seen by ENT 3/6/24 endorsing intermittent hoarseness, voice weakness for over a year now. Denies reflux. Does not smoke but has had secondary smoke exposure. Denies dysphagia. 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   Patient Stated Goal(s):  Improve voice    Past Medical History/Comorbidities:   Ms. Osei  has a past medical history of Hx of colonoscopy, Mammogram declined, Medicare annual wellness visit, subsequent, and Postmenopausal osteoporosis.  Ms. Osei  has a past surgical history that includes Partial hysterectomy (); Dilation and curettage of uterus; and hip surgery (Right, 2024).  Current Communication Status:  Communication Observation: Dysphonic  Follows Directions: follows two step commands/direction    Previous Speech Therapy: None reported or located in chart  Prior Level of Functioning: Retired, independent with ADLs, Lives independently.    General: Patient arrives independently    OBJECTIVE    Assessment(s) Given:    VOICE

## 2024-11-14 NOTE — PROGRESS NOTES
Heather Osei  : 1940  Primary: Medicare Part A And B  Secondary: BCBS [facility] @ [address]  [place] [address]  Phone: [phone]  Fax: [phone]    Effective Dates:   2024 to 2025   Frequency/Duration    1 time(s) a week for 90 days  SLP Visit Info:  No Show: 1 ()  Canceled Appointment: 1 ( mixed up appt day; also not feeling well with UTI)  Total # of Visits to Date: 11 (24)      OUTPATIENT SPEECH PATHOLOGY NOTE:Daily Note 2024  Appt Desk   Episode  Charges Attendance Report   Events        Treatment Diagnosis:    Dysphonia  Medical/Referring Diagnosis:    Dysphonia [R49.0]  Other diseases of larynx [J38.7]  Referring Physician:  Jason Hurd MD MD Orders:  Eval and Treat   Allergies:  Azithromycin and Levofloxacin  Medications Last Reviewed:  2024  Subjective Comments:  Patient had a UTI, still having some back pain. States she had to start a second round of antibiotics, stating the first one did not go right. She is on day 2 of new antibiotic. Feels she has had improvement with voice outside of today.  Pain:  Patient does not c/o pain.   Interventions Planned: (Treatment may consist of any combination of the following)        See Assessment Note  GOALS: (Goals have been discussed and agreed upon with patient.)  Short-Term Functional Goals: Time Frame: 12 weeks   Patient will demonstrate implementation of vocal hygiene techniques including improved hydration, reduced habitual coughing, throat clearing, and reduction of vocally abusive behaviors with 90% adherence on a daily basis to reduce laryngeal inflammation.  Patient will complete abdominal breathing, respiratory retraining exercises such as closed mouth breathing, sniff/blow, etc with 80% accuracy and mod cues.   Patient will demonstrate vocal function exercises and resonant voice therapy techniques across tasks with 80% accuracy given mod

## 2024-11-15 ENCOUNTER — HOSPITAL ENCOUNTER (OUTPATIENT)
Dept: PHYSICAL THERAPY | Age: 84
Setting detail: RECURRING SERIES
Discharge: HOME | End: 2024-11-18
Payer: MEDICARE

## 2024-11-15 PROBLEM — D63.8 ANEMIA OF CHRONIC DISEASE: Status: ACTIVE | Noted: 2024-11-15

## 2024-11-15 LAB — TRANSFERRIN SERPL-SCNC: 9.3 NMOL/L (ref 12.2–27.3)

## 2024-11-15 PROCEDURE — 97110 THERAPEUTIC EXERCISES: CPT

## 2024-11-15 PROCEDURE — 97140 MANUAL THERAPY 1/> REGIONS: CPT

## 2024-11-15 ASSESSMENT — PAIN SCALES - GENERAL: PAINLEVEL_OUTOF10: 2

## 2024-11-15 ASSESSMENT — PAIN DESCRIPTION - DESCRIPTORS: DESCRIPTORS: ACHING

## 2024-11-15 ASSESSMENT — PAIN DESCRIPTION - ORIENTATION: ORIENTATION: RIGHT

## 2024-11-15 ASSESSMENT — PAIN DESCRIPTION - LOCATION: LOCATION: HIP

## 2024-11-15 NOTE — PROGRESS NOTES
Heather Osei  : 1940  Primary: Medicare Part A And B (Medicare)  [facility] [address]  [place] [address]  Phone: [phone]  Fax: [phone] Plan Frequency: 1-2 times a week for 90 days    Plan of Care/Certification Expiration Date: 25        Plan of Care/Certification Expiration Date:  Plan of Care/Certification Expiration Date: 25    Frequency/Duration:   Plan Frequency: 1-2 times a week for 90 days      Time In/Out:   Time In: 1300  Time Out: 1345      PT Visit Info:    Progress Note Due Date: 24  Total # of Visits to Date: 3  Progress Note Counter: 3  Canceled Appointment: 1 (2024)      Visit Count:  3    OUTPATIENT PHYSICAL THERAPY:   Treatment Note 11/15/2024       Episode  (PT-Closed hip fracture)               Treatment Diagnosis:    Pain in right hip  Weakness of hip, unspecified laterality  Difficulty in walking, not elsewhere classified  Medical/Referring Diagnosis:    Closed hip fracture (HCC)    Referring Physician:  Jason Hurd MD MD Orders:  PT Eval and Treat   Return MD Appt:  Unknown   Date of Onset:  Onset Date: 10/17/24 (6 months ago)   Allergies:   Azithromycin and Levofloxacin  Restrictions/Precautions:   None      Interventions Planned (Treatment may consist of any combination of the following):     See Assessment Note    Subjective Comments:   Patient reports she has been on antibiotics for her UTI.  \"This is the first day that I have felt better\".  Initial Pain Level:: Right Hip 2/10  Post Session Pain Level:  Right  Hip  /10  Medications Last Reviewed:  11/15/2024  Updated Objective Findings:  None Today  Treatment   THERAPEUTIC EXERCISE: (25 minutes):    Exercises per grid below to improve mobility and strength.  Required minimal verbal cues to promote proper body alignment.  Progressed repetitions as indicated.   Date:  10/25/2024 Date:  2024 Date:  11/15/2024

## 2024-11-19 ENCOUNTER — HOSPITAL ENCOUNTER (OUTPATIENT)
Dept: PHYSICAL THERAPY | Age: 84
Setting detail: RECURRING SERIES
Discharge: HOME | End: 2024-11-22
Payer: MEDICARE

## 2024-11-19 PROCEDURE — 92507 TX SP LANG VOICE COMM INDIV: CPT

## 2024-11-19 NOTE — PROGRESS NOTES
Heather Osei  : 1940  Primary: Medicare Part A And B  Secondary: BCBS [facility] @ [address]  [place] [address]  Phone: [phone]  Fax: [phone]    Effective Dates:   2024 to 2025   Frequency/Duration    1 time(s) a week for 90 days  SLP Visit Info:  No Show: 1 ()  Canceled Appointment: 1 ( mixed up appt day; also not feeling well with UTI)  Total # of Visits to Date: 12      OUTPATIENT SPEECH PATHOLOGY NOTE:Daily Note 2024  Appt Desk   Episode  Charges Attendance Report   Events        Treatment Diagnosis:    Dysphonia  Medical/Referring Diagnosis:    Dysphonia [R49.0]  Other diseases of larynx [J38.7]  Referring Physician:  Jason Hurd MD MD Orders:  Eval and Treat   Allergies:  Azithromycin and Levofloxacin  Medications Last Reviewed:  2024  Subjective Comments:  Patient states she completed her antibiotic. Discussed at end of session that pt would benefit from another session, though pt requested to hold scheduling until an appointment time aligns with her PT schedule as she will already be coming to the clinic 2 days that week.  Pain:  Patient does not c/o pain.   Interventions Planned: (Treatment may consist of any combination of the following)        See Assessment Note  GOALS: (Goals have been discussed and agreed upon with patient.)  Short-Term Functional Goals: Time Frame: 12 weeks   Patient will demonstrate implementation of vocal hygiene techniques including improved hydration, reduced habitual coughing, throat clearing, and reduction of vocally abusive behaviors with 90% adherence on a daily basis to reduce laryngeal inflammation.  Patient will complete abdominal breathing, respiratory retraining exercises such as closed mouth breathing, sniff/blow, etc with 80% accuracy and mod cues.   Patient will demonstrate vocal function exercises and resonant voice therapy techniques across tasks with

## 2024-11-26 ENCOUNTER — HOSPITAL ENCOUNTER (OUTPATIENT)
Dept: PHYSICAL THERAPY | Age: 84
Setting detail: RECURRING SERIES
Discharge: HOME | End: 2024-11-29
Payer: MEDICARE

## 2024-11-26 PROCEDURE — 97110 THERAPEUTIC EXERCISES: CPT

## 2024-11-26 PROCEDURE — 97140 MANUAL THERAPY 1/> REGIONS: CPT

## 2024-11-26 ASSESSMENT — PAIN DESCRIPTION - DESCRIPTORS: DESCRIPTORS: ACHING

## 2024-11-26 ASSESSMENT — PAIN DESCRIPTION - LOCATION: LOCATION: HIP

## 2024-11-26 ASSESSMENT — PAIN SCALES - GENERAL: PAINLEVEL_OUTOF10: 0

## 2024-11-26 ASSESSMENT — PAIN DESCRIPTION - ORIENTATION: ORIENTATION: RIGHT

## 2024-11-26 NOTE — PROGRESS NOTES
Heather Osei  : 1940  Primary: Medicare Part A And B (Medicare)  Secondary: BCBS [facility] @ [address]  [place] [address]  Phone: [phone]  Fax: [phone] Plan Frequency: 1-2 times a week for 90 days    Plan of Care/Certification Expiration Date: 25        Plan of Care/Certification Expiration Date:  Plan of Care/Certification Expiration Date: 25    Frequency/Duration:   Plan Frequency: 1-2 times a week for 90 days      Time In/Out:   Time In: 1300  Time Out: 1340      PT Visit Info:    Progress Note Due Date: 24  Total # of Visits to Date: 4  Progress Note Counter: 1  Canceled Appointment: 1 (2024)      Visit Count:  4    OUTPATIENT PHYSICAL THERAPY:   Treatment Note 2024       Episode  (PT-Closed hip fracture)               Treatment Diagnosis:    Pain in right hip  Weakness of hip, unspecified laterality  Difficulty in walking, not elsewhere classified  Medical/Referring Diagnosis:    Closed hip fracture (HCC)    Referring Physician:  Jason Hurd MD MD Orders:  PT Eval and Treat   Return MD Appt:  Unknown   Date of Onset:  Onset Date: 10/17/24 (6 months ago)   Allergies:   Azithromycin and Levofloxacin  Restrictions/Precautions:   None      Interventions Planned (Treatment may consist of any combination of the following):     See Assessment Note    Subjective Comments:   Patient reports no pain right now.   Initial Pain Level:: Right Hip 0/10  Post Session Pain Level:  Right  Hip 0/10  Medications Last Reviewed:  2024  Updated Objective Findings:  See Progress Note from today  Treatment   THERAPEUTIC EXERCISE: (25 minutes):    Exercises per grid below to improve mobility and strength.  Required minimal verbal cues to promote proper body alignment.  Progressed repetitions as indicated.   Date:  2024 Date:  2024 Date:  11/15/2024   Activity/Exercise Parameters Parameters Parameters 
    Medical Necessity:   > Patient is expected to demonstrate progress in strength, range of motion, and pain  to improve ease with mobility.  Reason For Services/Other Comments:  > Patient continues to require skilled intervention due to increased pain interfering with activities of daily.    Regarding Heather Osei's therapy, I certify that the treatment plan above will be carried out by a therapist or under their direction.  Thank you for this referral,  NOLAN JACOBS PT     Referring Physician Signature: Jason Hurd MD No Signature is Required for this note.        Charge Capture  Events  Appt Desk  Attendance Report

## 2024-12-02 ENCOUNTER — HOSPITAL ENCOUNTER (OUTPATIENT)
Dept: PHYSICAL THERAPY | Age: 84
Setting detail: RECURRING SERIES
Discharge: HOME | End: 2024-12-05
Payer: MEDICARE

## 2024-12-02 PROCEDURE — 97110 THERAPEUTIC EXERCISES: CPT

## 2024-12-02 PROCEDURE — 97140 MANUAL THERAPY 1/> REGIONS: CPT

## 2024-12-02 ASSESSMENT — PAIN DESCRIPTION - DESCRIPTORS: DESCRIPTORS: ACHING

## 2024-12-02 ASSESSMENT — PAIN DESCRIPTION - ORIENTATION: ORIENTATION: RIGHT

## 2024-12-02 ASSESSMENT — PAIN DESCRIPTION - LOCATION: LOCATION: HIP

## 2024-12-02 ASSESSMENT — PAIN SCALES - GENERAL: PAINLEVEL_OUTOF10: 0

## 2024-12-02 NOTE — PROGRESS NOTES
Heather Osei  : 1940  Primary: Medicare Part A And B (Medicare)  Secondary: BCBS [facility] @ [address]  [place] [address]  Phone: [phone]  Fax: [phone] Plan Frequency: 1-2 times a week for 90 days    Plan of Care/Certification Expiration Date: 25        Plan of Care/Certification Expiration Date:  Plan of Care/Certification Expiration Date: 25    Frequency/Duration:   Plan Frequency: 1-2 times a week for 90 days      Time In/Out:   Time In: 0945  Time Out: 1025      PT Visit Info:    Progress Note Due Date: 24  Total # of Visits to Date: 5  Progress Note Counter: 2  Canceled Appointment: 1 (2024)      Visit Count:  5    OUTPATIENT PHYSICAL THERAPY:   Treatment Note 2024       Episode  (PT-Closed hip fracture)               Treatment Diagnosis:    Pain in right hip  Weakness of hip, unspecified laterality  Difficulty in walking, not elsewhere classified  Medical/Referring Diagnosis:    Closed hip fracture (HCC)    Referring Physician:  Jason Hurd MD MD Orders:  PT Eval and Treat   Return MD Appt:  Unknown   Date of Onset:  Onset Date: 10/17/24 (6 months ago)   Allergies:   Azithromycin and Levofloxacin  Restrictions/Precautions:   None      Interventions Planned (Treatment may consist of any combination of the following):     See Assessment Note    Subjective Comments:   Patient reports she took Tylenol this morning.  \"I am doing okay\".   Initial Pain Level:: Right Hip 0/10  Post Session Pain Level:  Right  Hip 0/10  Medications Last Reviewed:  2024  Updated Objective Findings:  None Today  Treatment   THERAPEUTIC EXERCISE: (25 minutes):    Exercises per grid below to improve mobility and strength.  Required minimal verbal cues to promote proper body alignment.  Progressed repetitions as indicated.   Date:  2024 Date:  2024 Date:  11/15/2024   Activity/Exercise Parameters Parameters

## 2024-12-05 ENCOUNTER — HOSPITAL ENCOUNTER (OUTPATIENT)
Dept: PHYSICAL THERAPY | Age: 84
Setting detail: RECURRING SERIES
Discharge: HOME | End: 2024-12-08
Payer: MEDICARE

## 2024-12-05 PROCEDURE — 97110 THERAPEUTIC EXERCISES: CPT

## 2024-12-05 PROCEDURE — 97140 MANUAL THERAPY 1/> REGIONS: CPT

## 2024-12-05 ASSESSMENT — PAIN SCALES - GENERAL: PAINLEVEL_OUTOF10: 0

## 2024-12-05 NOTE — PROGRESS NOTES
Heather Osei  : 1940  Primary: Medicare Part A And B (Medicare)  Secondary: BCBS [facility] @ [address]  [place] [address]  Phone: [phone]  Fax: [phone] Plan Frequency: 1-2 times a week for 90 days    Plan of Care/Certification Expiration Date: 25        Plan of Care/Certification Expiration Date:  Plan of Care/Certification Expiration Date: 25    Frequency/Duration:   Plan Frequency: 1-2 times a week for 90 days      Time In/Out:   Time In: 0900  Time Out: 0945      PT Visit Info:    Progress Note Due Date: 24  Total # of Visits to Date: 6  Progress Note Counter: 3  Canceled Appointment: 1 (2024)      Visit Count:  6    OUTPATIENT PHYSICAL THERAPY:   Treatment Note 2024       Episode  (PT-Closed hip fracture)               Treatment Diagnosis:    Pain in right hip  Weakness of hip, unspecified laterality  Difficulty in walking, not elsewhere classified  Medical/Referring Diagnosis:    Closed hip fracture (HCC)    Referring Physician:  Jason Hurd MD MD Orders:  PT Eval and Treat   Return MD Appt:  Unknown   Date of Onset:  Onset Date: 10/17/24 (6 months ago)   Allergies:   Azithromycin and Levofloxacin  Restrictions/Precautions:   None      Interventions Planned (Treatment may consist of any combination of the following):     See Assessment Note    Subjective Comments:   Patient reports she is not hurting, she feels pressure.    Initial Pain Level::     0/10  Post Session Pain Level:       0/10  Medications Last Reviewed:  2024  Updated Objective Findings:  See Discharge Note from today  Treatment   THERAPEUTIC EXERCISE: (25 minutes):    Exercises per grid below to improve mobility and strength.  Required minimal verbal cues to promote proper body alignment.  Progressed repetitions as indicated.   Date:  2024 Date:  2024 Date:  2024   Activity/Exercise Parameters Parameters

## 2024-12-05 NOTE — DISCHARGE SUMMARY
Heather Osei  : 1940  Primary: Medicare Part A And B (Medicare)  Secondary: BCBS [facility] @ [address]  [place] [address]  Phone: [phone]  Fax: [phone] Plan Frequency: 1-2 times a week for 90 days  Plan of Care/Certification Expiration Date: 25        Plan of Care/Certification Expiration Date:  Plan of Care/Certification Expiration Date: 25    Frequency/Duration: Plan Frequency: 1-2 times a week for 90 days      Time In/Out:   Time In: 0900  Time Out: 0945      PT Visit Info:    Progress Note Due Date: 24  Total # of Visits to Date: 6  Progress Note Counter: 3  Canceled Appointment: 1 (2024)      Visit Count:  6                OUTPATIENT PHYSICAL THERAPY:             Discharge Summary 2024               Episode (PT-Closed hip fracture)         Treatment Diagnosis:     Pain in right hip  Weakness of hip, unspecified laterality  Difficulty in walking, not elsewhere classified  Medical/Referring Diagnosis:    Closed hip fracture (HCC)    Referring Physician:  Jason Hurd MD MD Orders:  PT Eval and Treat   Return MD Appt:  Unknown   Date of Onset:  Onset Date: 10/17/24 (6 months ago)     Allergies:  Azithromycin and Levofloxacin  Restrictions/Precautions:    None      Medications Last Reviewed:  2024     SUBJECTIVE   History of Injury/Illness (Reason for Referral):  Patient reports falling and fracturing right hip.  Patient had right hip pinned on 2024.  Received order therapy from MD on 10/17/2024 to begin physical therapy for right hip pain.  Patent complains of right hip pain, weakness, and difficulty with walking.  Patient rates pain level as 2/10.  Patient has had no falls and patient uses no assistive device.  Patient reports limping more because of pain.    Patient Stated Goal(s):  Patient would like to walk without pain.   Initial Pain Level:      0/10   Post Session Pain Level:

## 2025-02-06 ENCOUNTER — OFFICE VISIT (OUTPATIENT)
Dept: FAMILY MEDICINE CLINIC | Facility: CLINIC | Age: 85
End: 2025-02-06

## 2025-02-06 VITALS
HEIGHT: 64 IN | OXYGEN SATURATION: 97 % | TEMPERATURE: 97.7 F | HEART RATE: 61 BPM | BODY MASS INDEX: 16.9 KG/M2 | RESPIRATION RATE: 16 BRPM | SYSTOLIC BLOOD PRESSURE: 153 MMHG | WEIGHT: 99 LBS | DIASTOLIC BLOOD PRESSURE: 70 MMHG

## 2025-02-06 DIAGNOSIS — F32.1 MODERATE SINGLE CURRENT EPISODE OF MAJOR DEPRESSIVE DISORDER (HCC): ICD-10-CM

## 2025-02-06 DIAGNOSIS — I10 ESSENTIAL HYPERTENSION: Primary | ICD-10-CM

## 2025-02-06 DIAGNOSIS — N18.31 STAGE 3A CHRONIC KIDNEY DISEASE (HCC): ICD-10-CM

## 2025-02-06 DIAGNOSIS — J47.1 BRONCHIECTASIS WITH ACUTE EXACERBATION (HCC): ICD-10-CM

## 2025-02-06 DIAGNOSIS — F41.9 ANXIETY: ICD-10-CM

## 2025-02-06 DIAGNOSIS — L82.1 KERATOSIS, SEBORRHEIC: ICD-10-CM

## 2025-02-06 DIAGNOSIS — E03.9 ACQUIRED HYPOTHYROIDISM: ICD-10-CM

## 2025-02-06 LAB
ANION GAP SERPL CALC-SCNC: 10 MMOL/L (ref 7–16)
BUN SERPL-MCNC: 28 MG/DL (ref 8–23)
CALCIUM SERPL-MCNC: 9.9 MG/DL (ref 8.8–10.2)
CHLORIDE SERPL-SCNC: 103 MMOL/L (ref 98–107)
CO2 SERPL-SCNC: 25 MMOL/L (ref 20–29)
CREAT SERPL-MCNC: 1.26 MG/DL (ref 0.6–1.1)
GLUCOSE SERPL-MCNC: 84 MG/DL (ref 70–99)
POTASSIUM SERPL-SCNC: 4.8 MMOL/L (ref 3.5–5.1)
SODIUM SERPL-SCNC: 139 MMOL/L (ref 136–145)
T4 FREE SERPL-MCNC: 1.2 NG/DL (ref 0.9–1.7)
TSH, 3RD GENERATION: 5.68 UIU/ML (ref 0.27–4.2)

## 2025-02-06 RX ORDER — LEVOTHYROXINE SODIUM 50 UG/1
50 TABLET ORAL
Qty: 90 TABLET | Refills: 1 | Status: SHIPPED | OUTPATIENT
Start: 2025-02-06

## 2025-02-06 RX ORDER — LOSARTAN POTASSIUM 50 MG/1
50 TABLET ORAL DAILY
Qty: 90 TABLET | Refills: 1 | Status: SHIPPED | OUTPATIENT
Start: 2025-02-06

## 2025-02-06 RX ORDER — BUSPIRONE HYDROCHLORIDE 15 MG/1
15 TABLET ORAL 3 TIMES DAILY
Qty: 270 TABLET | Refills: 1 | Status: SHIPPED | OUTPATIENT
Start: 2025-02-06

## 2025-02-06 RX ORDER — METOPROLOL SUCCINATE 50 MG/1
50 TABLET, EXTENDED RELEASE ORAL DAILY
Qty: 90 TABLET | Refills: 1 | Status: SHIPPED | OUTPATIENT
Start: 2025-02-06

## 2025-02-06 ASSESSMENT — PATIENT HEALTH QUESTIONNAIRE - PHQ9
2. FEELING DOWN, DEPRESSED OR HOPELESS: NOT AT ALL
SUM OF ALL RESPONSES TO PHQ QUESTIONS 1-9: 0
1. LITTLE INTEREST OR PLEASURE IN DOING THINGS: NOT AT ALL
SUM OF ALL RESPONSES TO PHQ QUESTIONS 1-9: 0
SUM OF ALL RESPONSES TO PHQ9 QUESTIONS 1 & 2: 0
SUM OF ALL RESPONSES TO PHQ QUESTIONS 1-9: 0
SUM OF ALL RESPONSES TO PHQ QUESTIONS 1-9: 0

## 2025-02-06 NOTE — PROGRESS NOTES
Subjective:  Heather Osei is a 84 y.o. female presents today for their semi-annual htn visit. They are having no side effects and are doing well.  Systems review of cardiovascular and pulmonary systems reveal no complaints or pertinent positives.  Patient denies any pounding heart beats or rapid heart beat intervals, flushing, panic like attacks, headaches, dizzyness or flushing.  They have drug reistant hypertension, on 3 or more different medicaitons including one diuretic- No  PHQ-9 Total Score: 0 (2/6/2025  9:44 AM)  Has a lesion left cheek that has been bothering her recently.  Would like to see if we can have that removed    If you don't have a home bp machine, you should purchase one at home and begin to check your pressure at home on a regular basis.  Your blood pressure goal is listed under the \"plan section\" below    Allergies   Allergen Reactions    Azithromycin Rash    Levofloxacin Anxiety      reports that she has never smoked. She has never been exposed to tobacco smoke. She has never used smokeless tobacco.  Current Outpatient Medications   Medication Sig Dispense Refill    metoprolol succinate (TOPROL XL) 50 MG extended release tablet Take 1 tablet by mouth daily 90 tablet 1    losartan (COZAAR) 50 MG tablet Take 1 tablet by mouth daily 90 tablet 1    levothyroxine (SYNTHROID) 50 MCG tablet Take 1 tablet by mouth every morning (before breakfast) 90 tablet 1    busPIRone (BUSPAR) 15 MG tablet Take 15 mg by mouth 3 times daily 270 tablet 1    melatonin 3 MG TABS tablet Take 1 tablet by mouth nightly as needed (insomnia) 30 tablet 0    vitamin D (CHOLECALCIFEROL) 25 MCG (1000 UT) TABS tablet Take 1 tablet by mouth       No current facility-administered medications for this visit.       Lab Results   Component Value Date/Time     04/07/2024 06:40 AM    K 3.9 04/07/2024 06:40 AM     04/07/2024 06:40 AM    CO2 24 04/07/2024 06:40 AM    BUN 16 04/07/2024 06:40 AM    CREATININE 0.90

## 2025-02-17 ENCOUNTER — PROCEDURE VISIT (OUTPATIENT)
Dept: FAMILY MEDICINE CLINIC | Facility: CLINIC | Age: 85
End: 2025-02-17
Payer: MEDICARE

## 2025-02-17 VITALS
RESPIRATION RATE: 16 BRPM | BODY MASS INDEX: 17.07 KG/M2 | HEART RATE: 61 BPM | TEMPERATURE: 97.5 F | DIASTOLIC BLOOD PRESSURE: 70 MMHG | SYSTOLIC BLOOD PRESSURE: 150 MMHG | OXYGEN SATURATION: 97 % | HEIGHT: 64 IN | WEIGHT: 100 LBS

## 2025-02-17 DIAGNOSIS — L57.0 KERATOSIS, ACTINIC: Primary | ICD-10-CM

## 2025-02-17 PROCEDURE — 17000 DESTRUCT PREMALG LESION: CPT | Performed by: FAMILY MEDICINE

## 2025-02-17 NOTE — PROGRESS NOTES
Subjective:  Heather Osei is a 84 y.o. female presents today for examination and treatment of brown scaly lesion left cheek and would like these removed because they are bothersome, get snagged easily, scabby and occassionally bleed.  Also lesion left forearm.  Allergies   Allergen Reactions    Azithromycin Rash    Levofloxacin Anxiety     PMH, MEDS reviewed    Objective:  Blood pressure (!) 150/70, pulse 61, temperature 97.5 °F (36.4 °C), resp. rate 16, height 1.626 m (5' 4\"), weight 45.4 kg (100 lb), SpO2 97%.  General- pleasant, no distress  Psych- alert and oriented to person, place and time  Mood and affect are appropriate to the visit  rrr s mrg. bcta  Skin reveals waxy papules present on left cheek, 1 cm  subjected to 20-30 second freeze thaw cycles for destruction with cryotherapy.  Adjacent cheek lesion not amenable to cryotherapy could be cyst or even early basal cell.  Left forearm has lesions described below and will need referred to dermatology  Assessment:  1. Keratosis, actinic      Plan:   The etiology, and prognosis of these lesions as well as what to expect with the cryotherapy is discussed.  These areas will blister and possibly scab and then fall off in a week or two.  Left forearm reveals erythematous area with several papules 1 has been excoriated and is more thickened and hypertrophied.  This could represent a larger squamous cell carcinoma  1. Keratosis, actinic  -     DESTRUC PREMALIGNANT, FIRST LESION      Followup:  As needed.   Return if any lesions come back or fail to disappear.  No follow-ups on file.

## 2025-04-30 ENCOUNTER — TELEPHONE (OUTPATIENT)
Dept: FAMILY MEDICINE CLINIC | Facility: CLINIC | Age: 85
End: 2025-04-30

## 2025-04-30 NOTE — TELEPHONE ENCOUNTER
Patient called to see if it would be OK to take Benadryl with her current medications.    Please call patient back to advise   [phone]

## 2025-07-29 ENCOUNTER — OFFICE VISIT (OUTPATIENT)
Dept: FAMILY MEDICINE CLINIC | Facility: CLINIC | Age: 85
End: 2025-07-29

## 2025-07-29 VITALS
BODY MASS INDEX: 16.73 KG/M2 | HEART RATE: 59 BPM | DIASTOLIC BLOOD PRESSURE: 71 MMHG | TEMPERATURE: 97.5 F | RESPIRATION RATE: 18 BRPM | HEIGHT: 64 IN | WEIGHT: 98 LBS | SYSTOLIC BLOOD PRESSURE: 151 MMHG

## 2025-07-29 DIAGNOSIS — E03.9 ACQUIRED HYPOTHYROIDISM: ICD-10-CM

## 2025-07-29 DIAGNOSIS — J47.1 BRONCHIECTASIS WITH ACUTE EXACERBATION (HCC): ICD-10-CM

## 2025-07-29 DIAGNOSIS — F41.9 ANXIETY: ICD-10-CM

## 2025-07-29 DIAGNOSIS — N18.30 STAGE 3 CHRONIC KIDNEY DISEASE, UNSPECIFIED WHETHER STAGE 3A OR 3B CKD (HCC): ICD-10-CM

## 2025-07-29 DIAGNOSIS — I10 ESSENTIAL HYPERTENSION: Primary | ICD-10-CM

## 2025-07-29 DIAGNOSIS — F32.1 MODERATE SINGLE CURRENT EPISODE OF MAJOR DEPRESSIVE DISORDER (HCC): ICD-10-CM

## 2025-07-29 LAB
ANION GAP SERPL CALC-SCNC: 11 MMOL/L (ref 7–16)
BUN SERPL-MCNC: 23 MG/DL (ref 8–23)
CALCIUM SERPL-MCNC: 9.8 MG/DL (ref 8.8–10.2)
CHLORIDE SERPL-SCNC: 103 MMOL/L (ref 98–107)
CO2 SERPL-SCNC: 26 MMOL/L (ref 20–29)
CREAT SERPL-MCNC: 1.15 MG/DL (ref 0.6–1.1)
GLUCOSE SERPL-MCNC: 79 MG/DL (ref 70–99)
POTASSIUM SERPL-SCNC: 4.3 MMOL/L (ref 3.5–5.1)
SODIUM SERPL-SCNC: 140 MMOL/L (ref 136–145)

## 2025-07-29 RX ORDER — LEVOTHYROXINE SODIUM 50 UG/1
50 TABLET ORAL
Qty: 90 TABLET | Refills: 1 | Status: SHIPPED | OUTPATIENT
Start: 2025-07-29

## 2025-07-29 RX ORDER — METOPROLOL SUCCINATE 50 MG/1
50 TABLET, EXTENDED RELEASE ORAL DAILY
Qty: 90 TABLET | Refills: 1 | Status: SHIPPED | OUTPATIENT
Start: 2025-07-29

## 2025-07-29 RX ORDER — BUSPIRONE HYDROCHLORIDE 15 MG/1
15 TABLET ORAL 3 TIMES DAILY
Qty: 270 TABLET | Refills: 1 | Status: SHIPPED | OUTPATIENT
Start: 2025-07-29

## 2025-07-29 RX ORDER — LOSARTAN POTASSIUM 50 MG/1
50 TABLET ORAL DAILY
Qty: 90 TABLET | Refills: 1 | Status: SHIPPED | OUTPATIENT
Start: 2025-07-29

## 2025-07-29 SDOH — ECONOMIC STABILITY: FOOD INSECURITY: WITHIN THE PAST 12 MONTHS, THE FOOD YOU BOUGHT JUST DIDN'T LAST AND YOU DIDN'T HAVE MONEY TO GET MORE.: NEVER TRUE

## 2025-07-29 SDOH — ECONOMIC STABILITY: FOOD INSECURITY: WITHIN THE PAST 12 MONTHS, YOU WORRIED THAT YOUR FOOD WOULD RUN OUT BEFORE YOU GOT MONEY TO BUY MORE.: NEVER TRUE

## 2025-07-29 ASSESSMENT — PATIENT HEALTH QUESTIONNAIRE - PHQ9
SUM OF ALL RESPONSES TO PHQ QUESTIONS 1-9: 0
SUM OF ALL RESPONSES TO PHQ QUESTIONS 1-9: 0
1. LITTLE INTEREST OR PLEASURE IN DOING THINGS: NOT AT ALL
2. FEELING DOWN, DEPRESSED OR HOPELESS: NOT AT ALL
SUM OF ALL RESPONSES TO PHQ QUESTIONS 1-9: 0
SUM OF ALL RESPONSES TO PHQ QUESTIONS 1-9: 0

## 2025-07-29 NOTE — PROGRESS NOTES
Subjective:  Heather Osei is a 85 y.o. female presents today for their semi-annual htn visit. They are having no side effects and are doing well.  Systems review of cardiovascular and pulmonary systems reveal no complaints or pertinent positives.  Patient denies any pounding heart beats or rapid heart beat intervals, flushing, panic like attacks, headaches, dizzyness or flushing.  They have drug reistant hypertension, on 3 or more different medicaitons including one diuretic- No  PHQ-9 Total Score: 0 (7/29/2025  9:32 AM)    How much are you exercising? Yes daily  Do you smoke? No  Are you taking your medicines as directed most every day? Yes  Do you follow a low sodium DASH diet or similar high blood pressure diet? No  Do you check your bp at home with an automated blood pressure device? Yes  If you don't have a home bp machine, you should purchase one at home and begin to check your pressure at home on a regular basis.  Your blood pressure goal is listed under the \"plan section\" below    Allergies   Allergen Reactions    Azithromycin Rash    Levofloxacin Anxiety      reports that she has never smoked. She has never been exposed to tobacco smoke. She has never used smokeless tobacco.  Current Outpatient Medications   Medication Sig Dispense Refill    busPIRone (BUSPAR) 15 MG tablet Take 15 mg by mouth 3 times daily 270 tablet 1    levothyroxine (SYNTHROID) 50 MCG tablet Take 1 tablet by mouth every morning (before breakfast) 90 tablet 1    losartan (COZAAR) 50 MG tablet Take 1 tablet by mouth daily 90 tablet 1    metoprolol succinate (TOPROL XL) 50 MG extended release tablet Take 1 tablet by mouth daily 90 tablet 1    melatonin 3 MG TABS tablet Take 1 tablet by mouth nightly as needed (insomnia) 30 tablet 0    vitamin D (CHOLECALCIFEROL) 25 MCG (1000 UT) TABS tablet Take 1 tablet by mouth       No current facility-administered medications for this visit.       Lab Results   Component Value Date/Time

## 2025-08-11 ENCOUNTER — TELEPHONE (OUTPATIENT)
Dept: FAMILY MEDICINE CLINIC | Facility: CLINIC | Age: 85
End: 2025-08-11

## (undated) DEVICE — 450 ML BOTTLE OF 0.05% CHLORHEXIDINE GLUCONATE IN 99.95% STERILE WATER FOR IRRIGATION, USP AND APPLICATOR.: Brand: IRRISEPT ANTIMICROBIAL WOUND LAVAGE

## (undated) DEVICE — CONTAINER,SPECIMEN,O.R.STRL,4.5OZ: Brand: MEDLINE

## (undated) DEVICE — BANDAGE COMPR W6INXL12FT SMOOTH FOR LIMB EXSANG ESMARCH

## (undated) DEVICE — BRONCHOSCOPY PACK: Brand: MEDLINE INDUSTRIES, INC.

## (undated) DEVICE — Z DISCONTINUED USE 2428344 SUTURE VICRYL SZ 2 L54IN ABSRB UD L65MM TP-1 1/2 CIR J880T

## (undated) DEVICE — CANNULA NSL ORAL AD FOR CAPNOFLEX CO2 O2 AIRLFE

## (undated) DEVICE — SINGLE USE SUCTION VALVE MAJ-209: Brand: SINGLE USE SUCTION VALVE (STERILE)

## (undated) DEVICE — ADHESIVE SKIN CLOSURE WND 8.661X1.5 IN 22 CM LIQUIBAND SECUR

## (undated) DEVICE — SYRINGE IRRIG 60ML SFT PLIABLE BLB EZ TO GRP 1 HND USE W/

## (undated) DEVICE — STRYKER PERFORMANCE SERIES SAGITTAL BLADE: Brand: STRYKER PERFORMANCE SERIES

## (undated) DEVICE — HOOD: Brand: FLYTE

## (undated) DEVICE — INTENDED TO AID IN THE PASSING OF SUTURES THROUGH BONE AND SOFT TISSUE DURING ORTHOPEDIC SURGERY: Brand: HOFFEE SUTURE RETRIEVER

## (undated) DEVICE — PREVENA INCISION MANAGEMENT SYSTEM- PEEL & PLACE DRESSING: Brand: PREVENA™ PEEL & PLACE™

## (undated) DEVICE — Device

## (undated) DEVICE — 7 DAY SILVER-COATED ANTIMICROBIAL BARRIER DRESSING: Brand: ACTICOAT 7  4" X 5"

## (undated) DEVICE — GLOVE ORANGE PI 8   MSG9080

## (undated) DEVICE — SOLUTION IRRIG 3000ML 0.9% SOD CHL USP UROMATIC PLAS CONT

## (undated) DEVICE — HIP HEMIARTHROPLASTY: Brand: MEDLINE INDUSTRIES, INC.

## (undated) DEVICE — KENDALL RADIOLUCENT FOAM MONITORING ELECTRODE RECTANGULAR SHAPE: Brand: KENDALL

## (undated) DEVICE — DRAPE,HIP,W/POUCHES,STERILE: Brand: MEDLINE

## (undated) DEVICE — DRESSING HYDROFIBER AQUACEL AG ADVANTAGE 3.5X14 IN

## (undated) DEVICE — SUTURE MONOCRYL + ABSORBABLE MONOFILAMENT 3-0 PS1 27 IN UD SXMP1B104

## (undated) DEVICE — STERILE PVP: Brand: MEDLINE INDUSTRIES, INC.

## (undated) DEVICE — SUTURE ABSORBABLE MONOFILAMENT 2-0 8 IN ANTIBACT STRATAFIX SXMP1B409

## (undated) DEVICE — DRESSING HYDROFIBER AQUACEL AG ADVANTAGE 3.5X10 IN

## (undated) DEVICE — SET,IRRIGATION,CYSTO/TUR: Brand: MEDLINE

## (undated) DEVICE — SINGLE USE BIOPSY VALVE MAJ-210: Brand: SINGLE USE BIOPSY VALVE (STERILE)